# Patient Record
Sex: FEMALE | Race: AMERICAN INDIAN OR ALASKA NATIVE
[De-identification: names, ages, dates, MRNs, and addresses within clinical notes are randomized per-mention and may not be internally consistent; named-entity substitution may affect disease eponyms.]

---

## 2018-05-15 ENCOUNTER — HOSPITAL ENCOUNTER (INPATIENT)
Dept: HOSPITAL 42 - ED | Age: 53
LOS: 6 days | Discharge: HOME | DRG: 175 | End: 2018-05-21
Attending: INTERNAL MEDICINE | Admitting: INTERNAL MEDICINE
Payer: MEDICAID

## 2018-05-15 VITALS — BODY MASS INDEX: 19.1 KG/M2

## 2018-05-15 DIAGNOSIS — D50.9: ICD-10-CM

## 2018-05-15 DIAGNOSIS — J15.9: ICD-10-CM

## 2018-05-15 DIAGNOSIS — B18.1: ICD-10-CM

## 2018-05-15 DIAGNOSIS — N17.9: ICD-10-CM

## 2018-05-15 DIAGNOSIS — I82.220: ICD-10-CM

## 2018-05-15 DIAGNOSIS — J47.9: ICD-10-CM

## 2018-05-15 DIAGNOSIS — R64: ICD-10-CM

## 2018-05-15 DIAGNOSIS — I27.20: ICD-10-CM

## 2018-05-15 DIAGNOSIS — B20: ICD-10-CM

## 2018-05-15 DIAGNOSIS — I26.99: Primary | ICD-10-CM

## 2018-05-15 DIAGNOSIS — B37.0: ICD-10-CM

## 2018-05-15 DIAGNOSIS — J45.909: ICD-10-CM

## 2018-05-15 DIAGNOSIS — I50.9: ICD-10-CM

## 2018-05-15 DIAGNOSIS — N13.30: ICD-10-CM

## 2018-05-15 DIAGNOSIS — Z86.13: ICD-10-CM

## 2018-05-15 LAB
ALBUMIN SERPL-MCNC: 3.1 G/DL (ref 3–4.8)
ALBUMIN/GLOB SERPL: 0.6 {RATIO} (ref 1.1–1.8)
ALT SERPL-CCNC: 29 U/L (ref 7–56)
AMORPH SED URNS QL MICRO: (no result)
APPEARANCE UR: CLEAR
APTT BLD: 26.6 SECONDS (ref 25.1–36.5)
AST SERPL-CCNC: 74 U/L (ref 14–36)
BACTERIA #/AREA URNS HPF: (no result) /[HPF]
BASE EXCESS BLDV CALC-SCNC: 4 MMOL/L (ref 0–2)
BASOPHILS # BLD AUTO: 0.01 K/MM3 (ref 0–2)
BASOPHILS NFR BLD: 0.1 % (ref 0–3)
BILIRUB UR-MCNC: NEGATIVE MG/DL
BNP SERPL-MCNC: 2430 PG/ML (ref 0–450)
BUN SERPL-MCNC: 26 MG/DL (ref 7–21)
CALCIUM SERPL-MCNC: 8.4 MG/DL (ref 8.4–10.5)
COLOR UR: YELLOW
D DIMER PPP FEU-MCNC: 956 NG/ML (ref 0–243)
EOSINOPHIL # BLD: 0 10*3/UL (ref 0–0.7)
EOSINOPHIL NFR BLD: 0 % (ref 1.5–5)
ERYTHROCYTE [DISTWIDTH] IN BLOOD BY AUTOMATED COUNT: 20.2 % (ref 11.5–14.5)
GFR NON-AFRICAN AMERICAN: 43
GLUCOSE UR STRIP-MCNC: NEGATIVE MG/DL
GRANULOCYTES # BLD: 6.43 10*3/UL (ref 1.4–6.5)
GRANULOCYTES NFR BLD: 75.8 % (ref 50–68)
HGB BLD-MCNC: 8 G/DL (ref 12–16)
HGB OTHER MFR BLD ELPH: (no result) /HPF (ref 0–2)
INR PPP: 1.21 (ref 0.93–1.08)
LEUKOCYTE ESTERASE UR-ACNC: (no result) LEU/UL
LYMPHOCYTES # BLD: 1.1 10*3/UL (ref 1.2–3.4)
LYMPHOCYTES NFR BLD AUTO: 13.1 % (ref 22–35)
MCH RBC QN AUTO: 25.8 PG (ref 25–35)
MCHC RBC AUTO-ENTMCNC: 31.4 G/DL (ref 31–37)
MCV RBC AUTO: 82.3 FL (ref 80–105)
MONOCYTES # BLD AUTO: 0.9 10*3/UL (ref 0.1–0.6)
MONOCYTES NFR BLD: 11 % (ref 1–6)
PH BLDV: 7.42 [PH] (ref 7.32–7.43)
PH UR STRIP: 6 [PH] (ref 4.7–8)
PLATELET # BLD: 266 10^3/UL (ref 120–450)
PMV BLD AUTO: 9.6 FL (ref 7–11)
PROT UR STRIP-MCNC: 100 MG/DL
PROTHROMBIN TIME: 13.9 SECONDS (ref 9.4–12.5)
RBC # BLD AUTO: 3.1 10^6/UL (ref 3.5–6.1)
RBC # UR STRIP: (no result) /UL
SP GR UR STRIP: 1.02 (ref 1–1.03)
TROPONIN I SERPL-MCNC: 0.02 NG/ML
URINE FINE GRANULAR CAST: (no result) /HPF (ref 0–2)
URINE HYALINE CAST: (no result) /HPF
UROBILINOGEN UR STRIP-ACNC: 2 E.U./DL
VENOUS BLOOD FIO2: 21 %
VENOUS BLOOD GAS PCO2: 45 (ref 40–60)
VENOUS BLOOD GAS PO2: 32 MM/HG (ref 30–55)
WBC # BLD AUTO: 8.5 10^3/UL (ref 4.5–11)

## 2018-05-16 LAB
% IRON SATURATION: 15 % (ref 20–55)
ALBUMIN SERPL-MCNC: 2.8 G/DL (ref 3–4.8)
ALBUMIN/GLOB SERPL: 0.6 {RATIO} (ref 1.1–1.8)
ALT SERPL-CCNC: 26 U/L (ref 7–56)
ANISOCYTOSIS BLD QL SMEAR: (no result)
AST SERPL-CCNC: 59 U/L (ref 14–36)
BASOPHILS # BLD AUTO: 0.01 K/MM3 (ref 0–2)
BASOPHILS NFR BLD: 0.1 % (ref 0–3)
BUN SERPL-MCNC: 20 MG/DL (ref 7–21)
CALCIUM SERPL-MCNC: 7.7 MG/DL (ref 8.4–10.5)
EOSINOPHIL # BLD: 0 10*3/UL (ref 0–0.7)
EOSINOPHIL NFR BLD: 0 % (ref 1.5–5)
EOSINOPHIL NFR BLD: 1 % (ref 0–3)
ERYTHROCYTE [DISTWIDTH] IN BLOOD BY AUTOMATED COUNT: 20.3 % (ref 11.5–14.5)
FERRITIN SERPL-MCNC: 1570 NG/ML
FOLATE SERPL-MCNC: 9.9 NG/ML
GFR NON-AFRICAN AMERICAN: 43
GRANULOCYTES # BLD: 8.35 10*3/UL (ref 1.4–6.5)
GRANULOCYTES NFR BLD: 80.9 % (ref 50–68)
HBV SURFACE AG SERPL QL CFM: (no result)
HDLC SERPL-MCNC: 12 MG/DL (ref 29–60)
HEPATITIS A IGM: NEGATIVE
HEPATITIS B CORE AB: NEGATIVE
HEPATITIS C ANTIBODY: NEGATIVE
HGB BLD-MCNC: 8 G/DL (ref 12–16)
HYPOCHROMIA BLD QL SMEAR: SLIGHT
IRON SERPL-MCNC: 27 UG/DL (ref 45–180)
LDLC SERPL-MCNC: 80 MG/DL (ref 0–129)
LYMPHOCYTE: 12 % (ref 22–35)
LYMPHOCYTES # BLD: 0.8 10*3/UL (ref 1.2–3.4)
LYMPHOCYTES NFR BLD AUTO: 8 % (ref 22–35)
MCH RBC QN AUTO: 25.6 PG (ref 25–35)
MCHC RBC AUTO-ENTMCNC: 31 G/DL (ref 31–37)
MCV RBC AUTO: 82.4 FL (ref 80–105)
MONOCYTE: 10 % (ref 1–6)
MONOCYTES # BLD AUTO: 1.1 10*3/UL (ref 0.1–0.6)
MONOCYTES NFR BLD: 11 % (ref 1–6)
NEUTROPHILS NFR BLD AUTO: 77 % (ref 50–70)
PLATELET # BLD EST: NORMAL 10*3/UL
PLATELET # BLD: 273 10^3/UL (ref 120–450)
PMV BLD AUTO: 9.7 FL (ref 7–11)
RBC # BLD AUTO: 3.13 10^6/UL (ref 3.5–6.1)
TARGETS BLD QL SMEAR: SLIGHT
TIBC SERPL-MCNC: 179 UG/DL (ref 265–497)
VIT B12 SERPL-MCNC: 907 PG/ML (ref 239–931)
WBC # BLD AUTO: 10.3 10^3/UL (ref 4.5–11)

## 2018-05-16 RX ADMIN — HEPARIN SODIUM PRN MLS/HR: 10000 INJECTION, SOLUTION INTRAVENOUS at 01:23

## 2018-05-16 RX ADMIN — CEFEPIME SCH MLS/HR: 1 INJECTION, SOLUTION INTRAVENOUS at 17:47

## 2018-05-16 RX ADMIN — PANTOPRAZOLE SODIUM SCH MG: 40 TABLET, DELAYED RELEASE ORAL at 10:02

## 2018-05-16 RX ADMIN — CEFEPIME SCH MLS/HR: 1 INJECTION, SOLUTION INTRAVENOUS at 10:07

## 2018-05-16 RX ADMIN — SULFAMETHOXAZOLE AND TRIMETHOPRIM SCH TAB: 400; 80 TABLET ORAL at 10:02

## 2018-05-16 RX ADMIN — VANCOMYCIN HYDROCHLORIDE SCH MLS/HR: 1 INJECTION, POWDER, LYOPHILIZED, FOR SOLUTION INTRAVENOUS at 12:26

## 2018-05-16 NOTE — CON
DATE:  05/16/2018



CARDIOLOGY CONSULTATION



HISTORY:  The patient is a 52-year-old woman, who presents to the emergency

room with a history of asthma, HIV in the past as well as malaria.  She

complains of shortness of breath.



She was recently traveled from [facility] with an upper respiratory

infection.



She denies previous cardiac history.  No angina and no chest pain noted.



History and review of systems are essentially unavailable.



PHYSICAL EXAMINATION:

VITAL SIGNS:  Blood pressure is 114/73, heart rates in the 90s.

NECK:  Negative JVD.

LUNGS:  Without rales.

HEART:  Reveals S1, S2.

EXTREMITIES:  Without edema.



EKG shows normal sinus rhythm with frequent PVCs and T-wave inversions in

V1 and V2.



LABORATORY DATA:  Troponins are negative.  Hemoglobin is 8.  BUN and

creatinine are 20 and 1.3.  V/Q scan shows an intermediate probability for

pulmonary embolism.  There is a moderate-size perfusion defect in the left

upper lobe.



Chest x-ray is pending.



IMPRESSION:

1.  Intermediate probability for pulmonary embolism.

2.  History of malaria.

3.  History of human immunodeficiency virus.

4.  Marked anemia.



PLAN:  Given these findings, the patient will need to review the chest

x-ray.  If there is a matching defect, a CT scan of the chest would be

appropriate.  An echocardiogram has been ordered to evaluate her LV

function.





__________________________________________

Ru Wylie MD



DD:  05/16/2018 8:15:49

DT:  05/16/2018 8:18:47

Job # [id number]

## 2018-05-16 NOTE — CARD
--------------- APPROVED REPORT --------------





EKG Measurement

Heart [id number]

IA [id number]

[id number]

[id number]

[id number]



<Conclusion>

Sinus tachycardia with frequent premature ventricular complexes

T wave abnormality, consider anterior ischemia

Abnormal ECG

## 2018-05-16 NOTE — CON
DATE:  2018



LOCATION:  The patient is seen in the room 264, bed 2.



CHIEF COMPLAINT  Shortness of breath times several days.



HISTORY OF PRESENT ILLNESS:  This is a 52-year-old female from [place] from

[facility].  She was recently there, returned from [facility].  She has had

diagnosis of malaria and was treated 3 weeks ago.  Also, she has had

positive HIV, which was diagnosed in , was on HIV medication, she does

not remember the name of the medication, she stopped it in 2017.  She has

not been on the HIV medication since and she states that day she does not

know her T-cells or viral load and she denies any headaches; minimal cough.

There is shortness of breath, low-grade fevers.  No abdominal pain,

diarrhea or constipation or bright red blood per rectum.



PAST MEDICAL HISTORY:  Significant for positive HIV, asthma and recent

diagnosis of malaria.



PAST SURGICAL HISTORY:  Significant for .



ALLERGIES:  THE PATIENT IS ALLERGIC TO PIPERACILLIN AND TAZOBACTAM, SHE

DOES NOT BELIEVE IT IS TYPE 1.



MEDICATIONS AT HOME:  Noted and reviewed.  She states she has not been

taking any medications at all, she stopped her HIV medications in 2017.



PHYSICAL EXAMINATION:

VITAL SIGNS:  Temperature is 100.4, blood pressure is 100/50, respiratory

rate of 21, heart rate of 103, saturation is 100% on room air.

HEENT:  Examination is unremarkable.

NECK:  Supple.

LUNGS:  Have decreased breath sounds.

HEART:  Normal S1, S2.

ABDOMEN:  Soft, nontender.



DATA:  Laboratory examination reveals the patient's white count is 8.5,

hemoglobin of 8, platelets of 266, 75% granulocytosis.  There is 10%

monocytosis.  Coagulation is noted and D-dimer is elevated.  Chemistries

reveal the creatinine is 1.3, which has come down to normal, today is 0.9..

Urinalysis is noted, many bacteria, 10-15 WBCs.  Review of orders reveals

blood cultures have been ordered and received chest x-ray reading is not

available.  TB workup has been ordered.  HIV PCR and CD-4 count has been

ordered and hepatitis profile.  The patient is empirically started on

cefepime, was given a dose of vancomycin.



ASSESSMENT AND PLAN:  A 52-year-old female with positive HIV, history of

recent diagnosis of malaria and history of asthma, now returns with

pulmonary emboli with SIRS, systemic inflammatory response syndrome, acute

kidney injury, currently on cefepime.  We will check on the pan cultures,

HIV status and chest x-ray results and we will make further recommendations

upon availability of initial results.  We will follow closely with you.  We

will also order a procalcitonin.







__________________________________________

Ricky Naik MD



DD:  2018 7:28:10

DT:  2018 7:31:26

Job # [id number]

## 2018-05-16 NOTE — CARD
--------------- APPROVED REPORT --------------





EKG Measurement

Heart [id number]

NE [id number]

[id number]

[id number]

[id number]



<Conclusion>

Normal sinus rhythm

T wave abnormality, consider anterior ischemia

Prolonged QT

Abnormal ECG

## 2018-05-16 NOTE — NM
EXAM:

  NM Lung Perfusion and Ventilation Scan



EXAM DATE/TIME:

  5/15/2018 8:47 PM



CLINICAL HISTORY:

  The patient age is 52 years old and is female; Signs and symptoms; Cough and 

shortness of breath; Symptoms not specified; Additional info: 52yof with SOB 

Facility exam id and description: Nm lungpv lung perf vent scan



TECHNIQUE:

  Nuclear Medicine ventilation and perfusion images of the lungs were obtained 

in multiple projections following inhalation of 33.0 mCi Tc99m-DTPA and 

injection of 3.5 mCi Tc99m MAA.



COMPARISON:

  No relevant prior studies available.



FINDINGS:

  Ventilation:  The ventilation is inhomogeneous. There is central airway 

deposition, consistent with COPD.

  Perfusion:  There is a moderate-sized perfusion defect involving the left 

upper lobe. No definitive perfusion defects are identified within the right 

lung.



IMPRESSION:     

1.  There is a moderate-sized perfusion defect involving the left upper lobe.

2.  Intermediate probability study.

3.  COPD.

## 2018-05-16 NOTE — US
HISTORY:

Leg pain and swelling. Evaluate for DVT



PHYSICIAN(S):  Ru Cotton MD.



TECHNIQUE:

Duplex sonography and color-flow Doppler with graded compression were 

used to evaluate the deep venous systems of both lower extremities. 

The exam is limited by edema.



FINDINGS:

The visualized deep venous systems of both lower extremities are 

sonographically normal and compressible. Normal wave forms and 

augmentation are seen. There is no sonographic evidence for deep 

venous thrombosis in the visualized segments of both lower 

extremities.



IMPRESSION:

No sonographic evidence for deep venous thrombosis in the visualized 

segments of both lower extremities.

## 2018-05-16 NOTE — CT
PROCEDURE:  CT Chest without contrast



HISTORY:

atypical infection in immunocompromised patient



COMPARISON:

None.



TECHNIQUE:

Contiguous axial images were obtained through the chest without 

intravenous contrast enhancement. Sagittal and coronal 

reconstructions were performed.







Radiation dose (DLP): 141 mGy-cm. 



This CT exam was performed using one or more of the following dose 

reduction techniques: Automated exposure control, adjustment of the 

mA and/or kV according to patient size, and/or use of iterative 

reconstruction technique.



FINDINGS:



LUNGS:

Multi focal small areas of consolidation are seen in both lungs.  

Findings are consistent with pneumonia 



MEDIASTINUM:

Unremarkable thoracic aorta. No aneurysm. Normal sized heart. Main 

pulmonary artery unremarkable. No vascular congestion. No 

lymphadenopathy.



PLEURA:

No pleural fluid. No pneumothorax.



BONES:

No fracture. No destructive lesion. 



UPPER ABDOMEN:

There is right-sided hydronephrosis and perinephric stranding.



OTHER FINDINGS:

None.



IMPRESSION:

Multi focal pneumonia



Right-sided hydronephrosis and perinephric stranding.  Possible 

pyelonephritis

## 2018-05-16 NOTE — RAD
HISTORY:

52yoF with sob  



COMPARISON:

No prior. 



FINDINGS:



LUNGS:

.Mild diffuse bilateral infiltrates. Rule out pulmonary edema/CHF 

versus pneumonia 



PLEURA:

No significant pleural effusion identified, no pneumothorax apparent.



CARDIOVASCULAR:

Normal.



OSSEOUS STRUCTURES:

No significant abnormalities.



VISUALIZED UPPER ABDOMEN:

Normal.



OTHER FINDINGS:

None.



IMPRESSION:

Diffuse bilateral infiltrates.  Rule out pulmonary edema/ CHF or 

pneumonia.

## 2018-05-16 NOTE — CON
DATE:  2018



HISTORY OF PRESENT ILLNESS:  This is a 52-year-old lady with history of HIV

and noncompliance with HAART medication, who presented with increased

shortness of breath and minimal cough.  Those symptoms were getting worse

over the course of 3-4 days.  In terms of other chronic associated

findings, the patient reports some weight loss over the last few months. 

She denies fever, chills, sweats or nausea, vomiting, diarrhea,

constipation.  The patient stopped taking her HAART medication for over a

year citing some family issues.  Over the course of hospitalization, the

patient was found to have VTE/PE and was started on therapeutic

anticoagulation.  Preliminary review of echocardiogram revealed right

ventricular dilatation and severe pulmonary hypertension with questionable

thrombus in the IVC.  Presently, the patient is on cefepime and vancomycin

and ID Service is following her as well.



PAST MEDICAL HISTORY:  HIV, questionable asthma, history of treated

malaria.



PAST SURGICAL HISTORY:   x3.



SOCIAL HISTORY:  The patient denies alcohol or illicit drug abuse.  Denies

tobacco smoking.



ALLERGIES:  ZOSYN.



MEDICATIONS:  At home, none.



REVIEW OF SYSTEMS:  Review of 12-organ systems other than mentioned in the

history of present illness is negative.



FAMILY HISTORY:  Noncontributory.



PHYSICAL EXAMINATION:

VITAL SIGNS:  Temperature 100.4, heart rate 103, blood pressure 114/73,

respiratory rate 18, oxygen saturation 98% on room air.

ENT:  Head and neck atraumatic.

LUNGS:  Clear to auscultation bilaterally.

HEART:  Regular rate and rhythm.  S1 and S2 normal.

ABDOMEN:  Soft, nontender and nondistended.

MUSCULOSKELETAL:  No C/C/E.

NEURO:  The patient moves all extremities spontaneously.

SKIN:  Moist.

PSYCH:  The patient is alert and oriented x3.



LABORATORY DATA:  Sodium 139, potassium 3.6, chloride 107, carbon dioxide

23, BUN 20, creatinine 1.3 (stable), glucose 101, bilirubin 1.4, AST 59,

ALT 26 (trended down), alkaline phosphatase 124.



Lactic acid as of yesterday 1.4, pH 7.42, INR 1.21 and PTT 58.3 (today).



V/Q scan of the lungs showed moderate-size perfusion defect involving the

left upper lobe, intermediate probability study.  Chest x-ray showed

questionable bilateral infiltrates.



ASSESSMENT AND PLAN:  This is a 52-year-old lady with human

immunodeficiency virus, severe pulmonary hypertension, who presented with

what appears to be acute pulmonary embolism/venous thromboembolism.  At

present time, the patient's symptoms substantially improved.  She is on

therapeutic anticoagulation and her PTT is within therapeutic range at

present time.  Infectious Disease Service is following her for human

immunodeficiency virus and potential community-acquired pneumonia.  CAT

scan of the chest will be done to evaluate for lymphadenopathy (whether she has 
one or not) and obtain

more details on bilateral infiltrate.  At present time, the patient is on

airborne isolation and three AFBs are pending.  QuantiFERON test is pending

as well.  Infectious workup is in progress.  I will avoid IV contrast due

to presence of acute kidney injury.  CAT scan will also allow better

assessment whether or not emphysema is present.  At present time, I would

continue target euvolemia, euglycemia, normothermia and oxygen saturation

more than 90%.  The patient is not hypoxemic and reports improved symptoms.

I have low suspicion for Pneumocystis carinii pneumonia.  We will continue

with gastrointestinal prophylaxis.



Addendum: CT chest showed some bronchiectases, most prominantly in left 
lingular segment, some associated with consolidative processes, multifocal 
small infiltrates, some nodular/mass-like and pleural based (RLL). No 
lymphadenopathy, which makes Mtb less likely, but in HIV setting Mtb may have 
atypical presentation. AFB x 3 ordered--if having hard time to produce any 
sputum will try hypertonic saline inhalation for sputum induction. Quantiferron 
test is pending. PPD placement under discretion of ID service.



ccm time 40 min





__________________________________________

Medardo Mckinley MD





DD:  2018 12:29:51

DT:  2018 12:34:03

Job # [id number]



BING

## 2018-05-16 NOTE — CP.PCM.HP
<Byron Tillman - Last Filed: 18 05:23>





History of Present Illness





- History of Present Illness


History of Present Illness: 





CC: Shortness of breath





HPI: 52 year old  female with past medical history that includes Asthma, 

HIV with unknown viral load or CD4 count, and recent diagnosis and treatment 

for malaria who presents to [facility] ED complaining of shortness of breath. Patient 

reports a 3 plus pillow orthopnea. Denies cough or swelling of lower 

extremities.  Patient reports she recently traveled from [facility] by airplane 3 

weeks prior to presentation. Patient was previously seen in [facility] and 

diagnosed with Malaria and treated inpatient for 2 weeks. Patient reports her 

Malaria was diagnosed via blood work. Of note patient has not been on any HAART 

therapy for HIV in over a year. Patient does not follow a regular doctor to 

manage her HIV. Patient reports a 3 kg weight loss secondary to lack of 

appetite over the past month. Other than shortness of breath, patient denies 

fever, chills, body aches, night sweats, headache, changes in vision, numbness, 

dizziness, hearing loss, acute memory loss, trouble swallowing, oral ulcers, 

chest pain, abdominal pain, diarrhea, constipation, new skin rashes or lesions. 

12 point ROS otherwise mentioned in HPI is benign.





PMH: HIV unknown viral load or CD4 count 


PSH:  x3


SOCHx: Denies Tobacco, ETOH, ID, patient lives with family and children, retired

, previously was a 


ALL: Piperacillin, tazobactam


Meds: MAR reviewed











Present on Admission





- Present on Admission


Any Indicators Present on Admission: No





Review of Systems





- Review of Systems


All systems: reviewed and no additional remarkable complaints except (as 

mentioned in HPI)





Past Patient History





- Past Social History


Smoking Status: Never Smoked


Alcohol: None


Drugs: Denies


Home Situation {Lives}: With Family





- CARDIAC


Hx Cardiac Disorders: No





- PULMONARY


Hx Respiratory Disorders: Yes


Hx Asthma: Yes





- NEUROLOGICAL


Hx Neurological Disorder: No





- HEENT


Hx HEENT Problems: No





- RENAL


Hx Chronic Kidney Disease: No





- ENDOCRINE/METABOLIC


Hx Endocrine Disorders: No





- HEMATOLOGICAL/ONCOLOGICAL


Hx Blood Disorders: Yes


Other/Comment: MALARIA





- INTEGUMENTARY


Hx Dermatological Problems: No





- MUSCULOSKELETAL/RHEUMATOLOGICAL


Hx Falls: No





- GASTROINTESTINAL


Hx Gastrointestinal Disorders: No





- GENITOURINARY/GYNECOLOGICAL


Hx Genitourinary Disorders: No





- PSYCHIATRIC


Hx Psychophysiologic Disorder: No


Hx Substance Use: No





- SURGICAL HISTORY


Hx  Section: Yes





Meds


Allergies/Adverse Reactions: 


 Allergies











Allergy/AdvReac Type Severity Reaction Status Date / Time


 


piperacillin [From Zosyn] AdvReac  ITCHING Verified 18 01:28


 


tazobactam [From Zosyn] AdvReac  ITCHING Verified 18 01:28














Physical Exam





- Constitutional


Appears: Non-toxic, No Acute Distress





- Head Exam


Head Exam: ATRAUMATIC, NORMAL INSPECTION, NORMOCEPHALIC





- Eye Exam


Eye Exam: EOMI, PERRL





- ENT Exam


ENT Exam: Mucous Membranes Dry





- Respiratory Exam


Respiratory Exam: Clear to Auscultation Bilateral, NORMAL BREATHING PATTERN.  

absent: Rales, Rhonchi, Wheezes





- Cardiovascular Exam


Cardiovascular Exam: REGULAR RHYTHM, +S1, +S2





- GI/Abdominal Exam


GI & Abdominal Exam: Normal Bowel Sounds, Soft.  absent: Tenderness





- Extremities Exam


Extremities exam: Positive for: pedal edema (1+ edemea b/l), pedal pulses 

present.  Negative for: calf tenderness, tenderness





- Neurological Exam


Neurological exam: Alert, Oriented x3


Additional comments: 





motor and sensory grossly intact, able to move all four extremities past 

midline 





- Psychiatric Exam


Psychiatric exam: Normal Affect, Normal Mood





- Skin


Skin Exam: Dry, Normal Color





Results





- Vital Signs


Recent Vital Signs: 





 Last Vital Signs











Temp  98.9 F   18 02:15


 


Pulse  92 H  18 02:15


 


Resp  21   18 02:29


 


BP  101/62   18 02:15


 


Pulse Ox  100   18 02:15














- Labs


Result Diagrams: 


 05/15/18 18:55





 05/15/18 18:55


Labs: 





 Laboratory Results - last 24 hr











  18





  00:27 00:27


 


Iron   27 L


 


TIBC   179 L


 


% Saturation   15 L


 


Triglycerides  144 


 


Cholesterol  114 L 


 


LDL Cholesterol Direct  80 


 


HDL Cholesterol  12 L 














Assessment & Plan





- Assessment and Plan (Free Text)


Assessment: 





52 year old  female with past medical history that includes Asthma, HIV 

with unknown viral load or CD4 count, and recent diagnosis and treatment for 

malaria who presents to [facility] ED complaining of shortness of breath. Patient with 

elevated D-Dimer, recent long flight travel, and V/Q scan showing moderate 

sized perfusion defect involving the left upper lobe. Patient given heparin 

bolus and placed on heparin gtt. 


Plan: 





Suspected Pulmonary Embolism


- Hx of recent long airflight, HIV, 


- D-Dimer 900+, V/Q scan showing moderate sized perfusion defect involving the 

left upper lobe, intermediate probability study


- CXR: no acute disease


- Echocardiogram


- PT/INR


- Heparin bolus, Heparin gtt





HIV


- Viral load, CD4/CD8 count


- Not currently on HAART therapy


- Bactrim for PCP ppx 


- ID consult





TIMO


- Cr 1.3/BUN 26


- Likely volume depleted 2/2 patient poor appetitie


- Holding IVF hydration at this time 2/2 sxs of CHF


- Monitor in AM





Hx of Malaria


- Recent inpatient treatment for 2 weeks in [facility] for Malaria, afebrile


- Patient unable to recall medication regiment


- Peripheral smear


- Haptoglobin, Reticulocyte count


- Malaria specific studies 





Anemia - Normocytic


- Hgb 8/Hct 25.5, MCV 82.3


- Patient borderline hypotensive, with some tachycardia


- CBC in AM


- Consider transfusion in light of potential cardiac disease 


- Iron, TIBC, Ferritin, Percent sat, Folate, VB12





Elevated BNP


- 2430 pro BNP


- Cardiology consult


- Echocardiogram





DVT ppx: Heparin gtt


GI ppx: Protonix





Case and plan discussed with attending 








- Date & Time


Date: 18


Time: 04:38





<Cami SNOW,Zachary - Last Filed: 18 05:55>





Results





- Vital Signs


Recent Vital Signs: 





 Last Vital Signs











Temp  100.4 F H  18 02:30


 


Pulse  103 H  18 02:30


 


Resp  18   18 02:30


 


BP  114/73   18 02:30


 


Pulse Ox  98   18 02:30














- Labs


Result Diagrams: 


 05/15/18 18:55





 05/15/18 18:55


Labs: 





 Laboratory Results - last 24 hr











  18





  00:27 00:27


 


Iron   27 L


 


TIBC   179 L


 


% Saturation   15 L


 


Triglycerides  144 


 


Cholesterol  114 L 


 


LDL Cholesterol Direct  80 


 


HDL Cholesterol  12 L 














Attending/Attestation





- Attestation


I have personally seen and examined this patient.: Yes


I have fully participated in the care of the patient.: Yes


I have reviewed all pertinent clinical information: Yes


Notes (Text): 











-I agree with the above H&P completed by the resident physician with the 

following additions and/or changes:





-The patient is a 52 year old  woman with a history of asthma and HIV (

last CD4 count unknown), who developed sudden SOB and dry cough after traveling 

on a flight from [facility] to [place] 3 days ago. She also reports having 

been diagnosed and treated for malaria 3 weeks ago while in [facility]. Of note, 

she hasnt taken any HIV meds for the past 1 year. She also reports two weeks 

of 3-pillow orthopnea as well as a 3kg unintentional weight loss. She denies 

any chest pain, PND, wheezing, dysuria, abdominal pain, OCP use, history of 

blood clots, fevers, chills, sweats, hemoptysis, hematuria or bloody stool. In 

the ED, her respiratory rate and O2 sat (on room air) were normal and her CXR 

showed increased vascular markings bilaterally. Given her numerous risk factors 

for TB (alongside the SOB and cough), shell be ruled out for pulmonary TB with 

sputum AFBs. Also, Bactrim will be started for PJP prophylaxes (since CD4 

unknown). Blood tests for hepatitis, CD4 count, HIV viral load and malaria have 

all been ordered. She will also be empirically treated for PNA. Cardiology and 

ID consults have been placed. Her symptoms are probably due to undiagnosed CHF 

and acute PE, as noted on V/Q scan. Therapeutic Heparin drip will be started 

and a Doppler BLE U/S and 2D-echo have both been ordered. Of note, Lasix not 

given due to the patients low-normal SBPs in the ED.

## 2018-05-17 LAB
ALBUMIN SERPL-MCNC: 2.5 G/DL (ref 3–4.8)
ALBUMIN/GLOB SERPL: 0.5 {RATIO} (ref 1.1–1.8)
ALT SERPL-CCNC: 21 U/L (ref 7–56)
AST SERPL-CCNC: 53 U/L (ref 14–36)
BASOPHILS # BLD AUTO: 0 K/MM3 (ref 0–2)
BASOPHILS NFR BLD: 0 % (ref 0–3)
BUN SERPL-MCNC: 18 MG/DL (ref 7–21)
CALCIUM SERPL-MCNC: 7.7 MG/DL (ref 8.4–10.5)
EOSINOPHIL # BLD: 0 10*3/UL (ref 0–0.7)
EOSINOPHIL NFR BLD: 0.1 % (ref 1.5–5)
ERYTHROCYTE [DISTWIDTH] IN BLOOD BY AUTOMATED COUNT: 20.8 % (ref 11.5–14.5)
GFR NON-AFRICAN AMERICAN: 52
GRANULOCYTES # BLD: 5.53 10*3/UL (ref 1.4–6.5)
GRANULOCYTES NFR BLD: 75.4 % (ref 50–68)
HGB BLD-MCNC: 7.3 G/DL (ref 12–16)
LYMPHOCYTES # BLD: 1 10*3/UL (ref 1.2–3.4)
LYMPHOCYTES NFR BLD AUTO: 13.1 % (ref 22–35)
MCH RBC QN AUTO: 25.3 PG (ref 25–35)
MCHC RBC AUTO-ENTMCNC: 30.4 G/DL (ref 31–37)
MCV RBC AUTO: 83.3 FL (ref 80–105)
MONOCYTES # BLD AUTO: 0.8 10*3/UL (ref 0.1–0.6)
MONOCYTES NFR BLD: 11.4 % (ref 1–6)
PLATELET # BLD: 281 10^3/UL (ref 120–450)
PMV BLD AUTO: 9.6 FL (ref 7–11)
RBC # BLD AUTO: 2.88 10^6/UL (ref 3.5–6.1)
T3 SERPL-MCNC: 0.59 NG/ML (ref 0.97–1.69)
T4 SERPL-MCNC: 5.6 UG/DL (ref 5.5–11)
WBC # BLD AUTO: 7.3 10^3/UL (ref 4.5–11)

## 2018-05-17 RX ADMIN — PANTOPRAZOLE SODIUM SCH MG: 40 TABLET, DELAYED RELEASE ORAL at 05:04

## 2018-05-17 RX ADMIN — SULFAMETHOXAZOLE AND TRIMETHOPRIM SCH TAB: 400; 80 TABLET ORAL at 09:39

## 2018-05-17 RX ADMIN — VANCOMYCIN HYDROCHLORIDE SCH MLS/HR: 1 INJECTION, POWDER, LYOPHILIZED, FOR SOLUTION INTRAVENOUS at 09:44

## 2018-05-17 RX ADMIN — CEFEPIME SCH MLS/HR: 1 INJECTION, SOLUTION INTRAVENOUS at 21:56

## 2018-05-17 RX ADMIN — HEPARIN SODIUM PRN MLS/HR: 10000 INJECTION, SOLUTION INTRAVENOUS at 05:03

## 2018-05-17 RX ADMIN — CEFEPIME SCH MLS/HR: 1 INJECTION, SOLUTION INTRAVENOUS at 02:30

## 2018-05-17 RX ADMIN — CEFEPIME SCH MLS/HR: 1 INJECTION, SOLUTION INTRAVENOUS at 09:39

## 2018-05-17 NOTE — PN
DATE:  05/17/2018



SUBJECTIVE:  The patient is seen and examined at bedside.  She is

comfortable.  She is comfortably sleeping in the bed, but easily arousable

and awaken.  She is alert, awake, oriented x3.  She reports that her

shortness of breath is gone.  She was able to produce first sputum

yesterday for AFB assessment (with help of inhaled hypertonic saline).  Her

next AFB sputum is due today at 2:00 p.m.



PHYSICAL EXAMINATION:

VITAL SIGNS:  Blood pressure 98/62, respiratory rate 18, oxygen saturation

98% on room air, temperature 99.2, heart rate 100.

ENT:  Head and neck atraumatic.

LUNGS:  Decreased breath sounds in the right base.

HEART:  Regular rate and rhythm.  S1, S2 normal.

ABDOMEN:  Soft, nontender and nondistended.

MUSCULOSKELETAL:  Some muscle wasting, but no C/C/E.

NEUROLOGIC:  The patient moves all extremities spontaneously.

SKIN:  Moist.

PSYCHOLOGIC:  The patient is alert and oriented x3.



LABORATORY DATA  WBC is 7.3, hemoglobin 7.3 (haptoglobin elevated, LDH

normal), platelet count 281.  Sodium 136, potassium 3.6, chloride 106,

carbon dioxide 23, BUN 18, creatinine 1.1 down from 1.3, glucose 80, AST

53, ALT 21, total bilirubin 1.2.  Procalcitonin 1.38.  The patient has

hepatitis B antigen positive.  PTT is 41.



MEDICATIONS  Heparin drip, cefepime, Protonix, inhaled hypertonic saline,

Bactrim, vancomycin.



ASSESSMENT AND PLAN:  This 52-year-old lady who presented with acute

pulmonary embolism with intermediate risk for short-term mortality, who was

started on therapeutic anticoagulation with heparin.  The patient's

symptoms substantially improved.  Her heparin dose will be adjusted

according to protocol.  Hematology consult was requested to figure out what

would be the best oral anticoagulant to switch to, provided combination of

the patient,'s comorbidity and social factors.  The patient had low-grade

fever 2 days ago which most likely can be attributed to VTE, however combination

of low grade fever and chest radiographic images did not allow to rule out

community-acquired pneumonia with certainty and the patient was started on

antibiotics while Infectious Disease service was following her.  The

patient was also put on isolation and first set of AFB sputum was sent for

analysis and received.  Whether or not to start atypical coverage including

azithromycin will be deferred to Infectious Disease service.  At present

time, the patient is on cefepime and vancomycin.  Her acute kidney injury

is substantially improved and creatinine now is 1.1 which is trending down.

It is reasonable to trend procalcitonin.  The patient definitely would need

to be followed as an outpatient at human immunodeficiency virus clinic. 

The patient's bronchiectasis most likely relates to human immunodeficiency

virus infection.  The patient does not produce copious amount of sputum. 

In fact,  she denies any coughing spells and required hypertonic saline for

sputum production.  We will continue to target euvolemia, euglycemia,

normothermia and saturation more than 90%.  Gastrointestinal prophylaxis.



ccm time 40 min









__________________________________________

Medardo Mckinley MD







DD:  05/17/2018 11:13:46

DT:  05/17/2018 11:18:37

Job # [id number]



BING

## 2018-05-17 NOTE — CP.PCM.PN
Addendum entered and electronically signed by Jeet Terrazas DO  05/17/18 15:42

: 





On physical exam





Patient has CVA tenderness on Right side only. 





Original Note:








<Jeet Terrazas - Last Filed: 05/17/18 14:27>





Subjective





- Date & Time of Evaluation


Date of Evaluation: 05/17/18


Time of Evaluation: 06:45





- Subjective


Subjective: 





PGY1 Medicine Note for Dr. Negrete





Patient seen and examined at bedside. No acute events overnight. Patient is 

resting comfortably in her bed stating that she feels much better. Overnight 

she experienced chils Her breathing is much improved. Her cough has improved 

and she is now having difficulty producing sputum for cultures. She is 

tolerating her diet, having normal bowel movements without any nausea or 

vomiting. She has no complaints at this time. Denies nausea, vomiting, diarrhea

, constipation, chest pain, shortness of breath, palpitations, abdominal pain, 

headaches, vision changes, numbness or tingling. 





Objective





- Vital Signs/Intake and Output


Vital Signs (last 24 hours): 


 











Temp Pulse Resp BP Pulse Ox


 


 97.1 F L  89   19   96/50 L  98 


 


 05/17/18 12:00  05/17/18 12:00  05/17/18 12:00  05/17/18 12:00  05/17/18 06:00








Intake and Output: 


 











 05/17/18 05/17/18





 06:59 18:59


 


Intake Total 1150 70


 


Balance 1150 70














- Medications


Medications: 


 Current Medications





Heparin Sodium/Sodium Chloride (Heparin 15225 Units/250ml 1/2 Normal Saline)  25

,000 units in 250 mls @ 9.226 mls/hr IV .Q24H PRN; Protocol; 18 UNITS/KG/HR


   PRN Reason: ADJUST RATE PER PROTOCOL


   Last Titration: 05/17/18 08:07 Dose:  20.29 units/kg/hr, 10.4 mls/hr


Vancomycin HCl (Vancomycin 1gm)  1 gm in 250 mls @ 167 mls/hr IVPB DAILY TD


   PRN Reason: Protocol


   Last Admin: 05/17/18 09:44 Dose:  167 mls/hr


Cefepime HCl (Maxipime 2gm)  2 gm in 100 mls @ 100 mls/hr IVPB Q12 TD


   PRN Reason: Protocol


   Stop: 05/21/18 01:46


Pantoprazole Sodium (Protonix Ec Tab)  40 mg PO 0600 [facility]


   Last Admin: 05/17/18 05:04 Dose:  40 mg


Sodium Chloride (Sodium Chloride 7% Inhalation)  4 ml IH QIDRESP [facility]


Trimethoprim/Sulfamethoxazole (Bactrim Ss Tab)  1 tab PO DAILY [facility]


   PRN Reason: Protocol


   Last Admin: 05/17/18 09:39 Dose:  1 tab











- Labs


Labs: 


 





 05/17/18 05:30 





 05/17/18 05:30 





 











PT  13.9 SECONDS (9.4-12.5)  H  05/15/18  18:55    


 


INR  1.21  (0.93-1.08)  H  05/15/18  18:55    


 


APTT  41.0 Seconds (25.1-36.5)  H  05/17/18  05:30    














- Constitutional


Appears: Non-toxic, No Acute Distress





- Head Exam


Head Exam: ATRAUMATIC, NORMOCEPHALIC





- Eye Exam


Eye Exam: EOMI, Normal appearance





- ENT Exam


ENT Exam: Mucous Membranes Moist





- Respiratory Exam


Respiratory Exam: Decreased Breath Sounds (right base), Clear to Ausculation 

Bilateral, NORMAL BREATHING PATTERN.  absent: Accessory Muscle Use, Rales, 

Rhonchi, Wheezes, Respiratory Distress





- Cardiovascular Exam


Cardiovascular Exam: REGULAR RHYTHM, +S1, +S2





- GI/Abdominal Exam


GI & Abdominal Exam: Soft, Normal Bowel Sounds.  absent: Distended, Firm, 

Guarding, Rigid, Tenderness





- Extremities Exam


Extremities Exam: absent: Calf Tenderness, Pedal Edema





- Neurological Exam


Neurological Exam: Alert, Awake, Oriented x3





- Psychiatric Exam


Psychiatric exam: Normal Affect, Normal Mood





- Skin


Skin Exam: Dry, Warm





Assessment and Plan





- Assessment and Plan (Free Text)


Assessment: 





52 year old  female with past medical history that includes Asthma, HIV 

with unknown viral load or CD4 count, and recent diagnosis and treatment for 

malaria who [place] [facility] [place] complaining of shortness of breath with left 

sided pulmonary embolism, HIV, multifocal pneumonia, hydronephrosis and 

pyelonephrosis.


Plan: 





Pulmonary Embolism


- Respiratory Consulted, Dr. Mckinley - help appreciated


- Hx of recent long airflight, HIV


- D-Dimer 900+, V/Q scan showing moderate sized perfusion defect involving the 

left upper lobe, intermediate probability study


   - No CTA due to patient's estimated GFR of 52 and TIMO.


- CXR: no acute disease


- Echocardiogram - awaiting official report


- PT/INR


- Heparin gtt





Lung Infiltrates, probable multi-focal pneumonia r/o TB


- Chest CT w/o - Multi focal pneumonia. Right-sided hydronephrosis and 

perinephric stranding.  Possible pyelonephritis


- CXR: no acute disease


- Blood Culture - negative at 24 hours x 2


- Urine Culture - negative


- Procalcitonin 1.38


- Legionella - negative


- Strep pneuo - f/u


- Alpha-1 antitrypsin - f/u


- Vanco 1gm IVPB daily (started 5/16)


- Cefepime 2gm IVPB q12h (started 5/16, last dose 5/21)


- Bactrim SS 1 tab PO daily (started 5/16)





- Patient currently on droplet precautions, can d/c after 1 AFB as TB is not 

likely. Patient is not coughing up any blood and there is no radiographic 

evidence in correlation with TB. 


   - AFB - f/u


   - Quat Gold - f/u


   - PPD placed





HIV


- f/u Viral load, CD4/CD8 count


- Not currently on HAART therapy


- Bactrim for PCP ppx 


- ID consult





TIMO 


- Cr 1.3 upon admission --> improving to Cr 1.1 today


- Likely volume depleted 2/2 patient poor appetitie


- Holding IVF hydration at this time 2/2 sxs of CHF


- Monitor in AM





Hx of Malaria


- Recent inpatient treatment for 2 weeks in [facility] for Malaria, afebrile


- Patient unable to recall medication regiment


- Peripheral smear


- Haptoglobin, Reticulocyte count


- Malaria specific studies 





Anemia - Normocytic


- Hgb 7.3/Hct 24.0, MCV 75.4


- Patient borderline hypotensive, with some tachycardia


- CBC in AM


- Consider transfusion in light of potential cardiac disease 


- Iron, TIBC, Ferritin, Percent sat, Folate, VB12





Elevated BNP


- 2430 pro BNP


- Cardiology consult


- Echocardiogram - EF ~50%, Moderate MR, moderate TR, severe pulmonary HTN. 

Right ventricle moderately dilated, right atrium severely dilated. 





Hx of Hep B


- Hep Bs ag positive


- Hep B core IgM Ab negative





DVT ppx: Heparin gtt


GI ppx: Protonix





Case discussed with Dr. Penelope Terrazas PGY1





<Colt Negrete - Last Filed: 05/17/18 16:33>





Objective





- Vital Signs/Intake and Output


Vital Signs (last 24 hours): 


 











Temp Pulse Resp BP Pulse Ox


 


 97.1 F L  89   19   96/50 L  98 


 


 05/17/18 12:00  05/17/18 12:00  05/17/18 12:00  05/17/18 12:00  05/17/18 06:00








Intake and Output: 


 











 05/17/18 05/17/18





 06:59 18:59


 


Intake Total 1150 70


 


Balance 1150 70














- Medications


Medications: 


 Current Medications





Heparin Sodium/Sodium Chloride (Heparin 53531 Units/250ml 1/2 Normal Saline)  25

,000 units in 250 mls @ 9.226 mls/hr IV .Q24H PRN; Protocol; 18 UNITS/KG/HR


   PRN Reason: ADJUST RATE PER PROTOCOL


   Last Titration: 05/17/18 08:07 Dose:  20.29 units/kg/hr, 10.4 mls/hr


Vancomycin HCl (Vancomycin 1gm)  1 gm in 250 mls @ 167 mls/hr IVPB DAILY TD


   PRN Reason: Protocol


   Last Admin: 05/17/18 09:44 Dose:  167 mls/hr


Cefepime HCl (Maxipime 2gm)  2 gm in 100 mls @ 100 mls/hr IVPB Q12 TD


   PRN Reason: Protocol


   Stop: 05/21/18 01:46


Iron Sucrose 200 mg/ Sodium (Chloride)  110 mls @ 110 mls/hr IVPB ONCE ONE


   Stop: 05/17/18 17:12


Pantoprazole Sodium (Protonix Ec Tab)  40 mg PO 0600 [facility]


   Last Admin: 05/17/18 05:04 Dose:  40 mg


Sodium Chloride (Sodium Chloride 7% Inhalation)  4 ml IH QIDRESP TD


Trimethoprim/Sulfamethoxazole (Bactrim Ss Tab)  1 tab PO DAILY TD


   PRN Reason: Protocol


   Last Admin: 05/17/18 09:39 Dose:  1 tab











- Labs


Labs: 


 





 05/17/18 05:30 





 05/17/18 05:30 





 











PT  13.9 SECONDS (9.4-12.5)  H  05/15/18  18:55    


 


INR  1.21  (0.93-1.08)  H  05/15/18  18:55    


 


APTT  77.6 Seconds (25.1-36.5)  H  05/17/18  14:05    














Attending/Attestation





- Attestation


I have personally seen and examined this patient.: Yes


I have fully participated in the care of the patient.: Yes


I have reviewed all pertinent clinical information, including history, physical 

exam and plan: Yes


Notes (Text): 





05/17/18 16:25





Attending note;





Patient seen and examined with resident.





Patient is a 52 year old  woman with a history of asthma and HIV (last 

CD4 count unknown), who developed sudden SOB and dry cough after traveling on a 

flight from [facility] to [place] 3 days ago. She also reports having been 

diagnosed and treated for malaria 3 weeks ago while in [facility].


VQ scan showed intermediate probability for PE. Reviewed with the radiologist.


Currently on IV heparin drip.


Doppler lower extremities negative.


Echocardiogram showed moderate LV dysfunction with severe pulmonary 

hypertension and IVC thrombus.


 cardiology evaluation appreciated.





Anemia; mostly secondary to chronic HIV and  iron deficiency anemia. Started on 

IV iron.


Hematology evaluation requested.





History of HIV; does not know her CD4 count. Was not on medication over a year. 

CD4 count ordered.





Hepatitis B surface antigen positive.





Weight loss; mostly secondary to advanced HIV. Pending CD4 count. Dietitian 

evaluation requested.





Rule out tuberculosis; patient does not have any cough or sputum production. CT 

chest showed multifocal pneumonia and right pyelonephritis.


Case discussed with ID in detail.


We can DC isolation if 1 AFB is negative.


Pulmonary evaluation appreciated.





Currently on IV vancomycin and cefepime.


Blood culture is negative


Urine culture is negative. Legionella was negative.





History of malaria; treated recently in [facility]. Follow-up peripheral smear.





 evaluation requested.





Upon discharge the patient will be referred [place]  [facility] [place]. Patient will be 

referred to HIV clinic/[facility].


05/17/18 16:33

## 2018-05-17 NOTE — PN
DATE:  05/17/2018



LOCATION:  The patient seen in room 264, bed 2.



SUBJECTIVE:  The patient is seen earlier today.  Temperature is down. 

Uneventful night.



PHYSICAL EXAMINATION:

VITAL SIGNS:  Temperature is 99.2, T-max yesterday was 100.4, heart rate of

100, blood pressure is 98/60, respiratory rate of 21.

HEENT:  Unremarkable.

NECK:  Supple.

LUNGS:  Have decreased breath sounds.

HEART:  Normal S1, S2.

ABDOMEN:  Soft, nontender.  No rebound or guarding.



LABORATORY EXAMINATION:  Reveals a white count of 7.3, hemoglobin of 7,

platelets of 281.  BUN of 18, creatinine of 1.1.  Procalcitonin is elevated

at 1.38.  Urinalysis is noted.  Urine for Legionella antigen is negative. 

HIV is positive.  Hepatitis C is negative.  Hepatitis B surface antigen is

reactive.  Hepatitis B core IgM antibody is negative.  Blood cultures are

reported to be negative.  Urine culture is reported to be negative.  CAT

scan of the chest is reviewed.  Malaria smears are negative and pathology.



ASSESSMENT AND PLAN:  A 52-year-old female who is  from [facility] who

is positive human immunodeficiency virus, asthma, recent diagnosis of

malaria and now returns with diagnosis of pulmonary emboli with systemic

inflammatory response syndrome, acute kidney injury and bacterial

community-acquired pneumonia and elevated procalcitonin with negative

cultures, awaiting for human immunodeficiency virus polymerase chain

reaction and awaiting for T cells, tuberculosis workup.  We will make

further recommendations upon availability of initial results.





__________________________________________

Ricky Naik MD



DD:  05/17/2018 12:50:04

DT:  05/17/2018 12:53:33

Job # [id number]

## 2018-05-18 LAB
% CD4 (T HELPER CELL): 3 PERCENT (ref 30–61)
% CD8 (SUPPRESSOR T CELL): 63 PERCENT (ref 12–42)
ABSOLUTE CD4 CELLS: 21 CELLS/MCL (ref 490–1740)
ABSOLUTE CD8 CELLS: 400 CELLS/MCL (ref 180–1170)
ABSOLUTE LYMPHOCYTES: 632 CELLS/MCL (ref 850–3900)
ALBUMIN SERPL-MCNC: 2.4 G/DL (ref 3–4.8)
ALBUMIN/GLOB SERPL: 0.5 {RATIO} (ref 1.1–1.8)
ALT SERPL-CCNC: 24 U/L (ref 7–56)
AST SERPL-CCNC: 47 U/L (ref 14–36)
BASOPHILS # BLD AUTO: 0.01 K/MM3 (ref 0–2)
BASOPHILS NFR BLD: 0.2 % (ref 0–3)
BUN SERPL-MCNC: 15 MG/DL (ref 7–21)
CALCIUM SERPL-MCNC: 7.8 MG/DL (ref 8.4–10.5)
EOSINOPHIL # BLD: 0 10*3/UL (ref 0–0.7)
EOSINOPHIL NFR BLD: 0.7 % (ref 1.5–5)
ERYTHROCYTE [DISTWIDTH] IN BLOOD BY AUTOMATED COUNT: 20.7 % (ref 11.5–14.5)
GFR NON-AFRICAN AMERICAN: 52
GRANULOCYTES # BLD: 4.31 10*3/UL (ref 1.4–6.5)
GRANULOCYTES NFR BLD: 72.5 % (ref 50–68)
HELPER/SUPPRESSOR RATIO: 0.05 RATIO (ref 0.86–5)
HGB BLD-MCNC: 7 G/DL (ref 12–16)
LYMPHOCYTES # BLD: 0.9 10*3/UL (ref 1.2–3.4)
LYMPHOCYTES NFR BLD AUTO: 15 % (ref 22–35)
MCH RBC QN AUTO: 25.9 PG (ref 25–35)
MCH RBC QN AUTO: 27.4 PG (ref 27–33)
MCHC RBC AUTO-ENTMCNC: 31.1 G/DL (ref 31–37)
MCV RBC AUTO: 83.3 FL (ref 80–105)
MCV RBC: 84.3 FL (ref 80–100)
MONOCYTES # BLD AUTO: 0.7 10*3/UL (ref 0.1–0.6)
MONOCYTES NFR BLD: 11.6 % (ref 1–6)
PLATELET # BLD: 263 10^3/UL (ref 120–450)
PMV BLD AUTO: 8.9 FL (ref 7–11)
RBC # BLD AUTO: 2.7 10^6/UL (ref 3.5–6.1)
WBC # BLD AUTO: 5.9 10^3/UL (ref 4.5–11)

## 2018-05-18 PROCEDURE — 30233N1 TRANSFUSION OF NONAUTOLOGOUS RED BLOOD CELLS INTO PERIPHERAL VEIN, PERCUTANEOUS APPROACH: ICD-10-PCS | Performed by: INTERNAL MEDICINE

## 2018-05-18 RX ADMIN — CEFEPIME SCH MLS/HR: 1 INJECTION, SOLUTION INTRAVENOUS at 23:22

## 2018-05-18 RX ADMIN — VANCOMYCIN HYDROCHLORIDE SCH MLS/HR: 1 INJECTION, POWDER, LYOPHILIZED, FOR SOLUTION INTRAVENOUS at 09:34

## 2018-05-18 RX ADMIN — CEFEPIME SCH MLS/HR: 1 INJECTION, SOLUTION INTRAVENOUS at 09:34

## 2018-05-18 RX ADMIN — HEPARIN SODIUM PRN MLS/HR: 10000 INJECTION, SOLUTION INTRAVENOUS at 10:25

## 2018-05-18 RX ADMIN — PANTOPRAZOLE SODIUM SCH MG: 40 TABLET, DELAYED RELEASE ORAL at 05:48

## 2018-05-18 RX ADMIN — SULFAMETHOXAZOLE AND TRIMETHOPRIM SCH TAB: 400; 80 TABLET ORAL at 09:33

## 2018-05-18 NOTE — CP.PCM.PN
<Jeet Terrazas - Last Filed: 05/18/18 16:39>





Subjective





- Date & Time of Evaluation


Date of Evaluation: 05/18/18


Time of Evaluation: 06:55





- Subjective


Subjective: 





PGY1 Medicine Note for Dr. Negrete





Patient seen and examined at bedside. No acute events overnight. Patient is 

feeling well. She is still on contact precautions waiting for AFB cultures to 

return. She has been able to walk around her room without getting lightheaded 

or dizzy. She is no longer experiencing chills or right sided back/flank pain. 

She is tolerating her diet, having normal bowel movements without any nausea or 

vomiting. She has no complaints at this time. Denies nausea, vomiting, diarrhea

, constipation, chest pain, shortness of breath, palpitations, abdominal pain, 

headaches, vision changes, numbness or tingling. 





Objective





- Vital Signs/Intake and Output


Vital Signs (last 24 hours): 


 











Temp Pulse Resp BP Pulse Ox


 


 98.1 F   93 H  20   95/54 L  97 


 


 05/18/18 06:00  05/18/18 06:00  05/18/18 06:00  05/18/18 06:00  05/18/18 06:00








Intake and Output: 


 











 05/18/18 05/18/18





 06:59 18:59


 


Intake Total 770 90


 


Balance 770 90














- Medications


Medications: 


 Current Medications





Heparin Sodium/Sodium Chloride (Heparin 34987 Units/250ml 1/2 Normal Saline)  25

,000 units in 250 mls @ 9.226 mls/hr IV .Q24H PRN; Protocol; 18 UNITS/KG/HR


   PRN Reason: ADJUST RATE PER PROTOCOL


   Last Admin: 05/18/18 10:25 Dose:  17.29 units/kg/hr, 8.862 mls/hr


Cefepime HCl (Maxipime 2gm)  2 gm in 100 mls @ 100 mls/hr IVPB Q12 TD


   PRN Reason: Protocol


   Stop: 05/21/18 01:46


   Last Admin: 05/18/18 09:34 Dose:  100 mls/hr


Pantoprazole Sodium (Protonix Ec Tab)  40 mg PO 0600 TD


   Last Admin: 05/18/18 05:48 Dose:  40 mg


Sodium Chloride (Sodium Chloride 7% Inhalation)  4 ml IH QIDRESP [facility]


Trimethoprim/Sulfamethoxazole (Bactrim Ss Tab)  1 tab PO DAILY TD


   PRN Reason: Protocol


   Last Admin: 05/18/18 09:33 Dose:  1 tab











- Labs


Labs: 


 





 05/18/18 03:25 





 05/18/18 03:25 





 











PT  13.9 SECONDS (9.4-12.5)  H  05/15/18  18:55    


 


INR  1.21  (0.93-1.08)  H  05/15/18  18:55    


 


APTT  69.7 Seconds (25.1-36.5)  H  05/18/18  09:30    














- Constitutional


Appears: Non-toxic, No Acute Distress





- Head Exam


Head Exam: ATRAUMATIC, NORMOCEPHALIC





- Eye Exam


Eye Exam: EOMI, Normal appearance





- ENT Exam


ENT Exam: Mucous Membranes Moist





- Neck Exam


Neck Exam: absent: Lymphadenopathy





- Respiratory Exam


Respiratory Exam: Clear to Ausculation Bilateral, NORMAL BREATHING PATTERN.  

absent: Accessory Muscle Use, Rales, Rhonchi, Wheezes, Respiratory Distress





- Cardiovascular Exam


Cardiovascular Exam: REGULAR RHYTHM, +S1, +S2





- GI/Abdominal Exam


GI & Abdominal Exam: Soft, Normal Bowel Sounds.  absent: Distended, Firm, 

Guarding, Rigid, Tenderness





- Extremities Exam


Extremities Exam: absent: Calf Tenderness, Pedal Edema





- Back Exam


Back Exam: NORMAL INSPECTION.  absent: CVA tenderness (L), CVA tenderness (R), 

paraspinal tenderness





- Neurological Exam


Neurological Exam: Alert, Awake, CN II-XII Intact, Oriented x3





- Psychiatric Exam


Psychiatric exam: Normal Affect, Normal Mood





- Skin


Skin Exam: Dry, Warm





Assessment and Plan





- Assessment and Plan (Free Text)


Assessment: 





52 year old  female with past medical history that includes Asthma, HIV 

with unknown viral load or CD4 count, and recent diagnosis and treatment for 

malaria who presents to [facility] complaining of shortness of breath with left 

sided pulmonary embolism, HIV, multifocal pneumonia, hydronephrosis and 

pyelonephrosis.


Plan: 





Pulmonary Embolism


- Respiratory Consulted, Dr. Mckinlye - help appreciated


- Hx of recent long airflight, HIV


- D-Dimer 900+, V/Q scan showing moderate sized perfusion defect involving the 

left upper lobe, intermediate probability study


   - No CTA due to patient's estimated GFR of 52 and TIMO.


- CXR: no acute disease


- Echocardiogram - EF ~50%, Moderate MR, moderate TR, severe pulmonary HTN. 

Right ventricle moderately dilated, right atrium severely dilated.


- PT/INR


- Heparin gtt - bridge to coumadin 


- Coumadin 5mg PO daily to stat tonight





Lung Infiltrates, probable multi-focal pneumonia r/o TB


- Chest CT w/o - Multi focal pneumonia. Right-sided hydronephrosis and 

perinephric stranding.  Possible pyelonephritis


- CXR: no acute disease


- Blood Culture - negative at 48 hours x 2


- Urine Culture - negative


- Procalcitonin 1.38


- Legionella - negative


- Strep pneuo - f/u


- Alpha-1 antitrypsin - 244 (high)


- Vanco 1gm IVPB daily (started 5/16)


- Cefepime 2gm IVPB q12h (started 5/16, last dose 5/21)


- Bactrim SS 1 tab PO daily (started 5/16)


- Started on Clarithromycin





- Patient currently on droplet precautions, can d/c after 1 AFB as TB is not 

likely. Patient is not coughing up any blood and there is no radiographic 

evidence in correlation with TB. 


   - AFB - negative x2 - isolation dc'd


   - Quat Gold - f/u





AIDS/HIV


- CD4 count 21


- CD8 count 400


- Viral load 6.28


- Not currently on HAART therapy


- Bactrim for PCP ppx 


- Started on Clarithromycin


- ID consult





TIMO 


- Cr 1.3 upon admission -->  Cr 1.1 today


- Likely volume depleted 2/2 patient poor appetitie


- Holding IVF hydration at this time 2/2 sxs of CHF


- Monitor in AM





Hx of Malaria


- Recent inpatient treatment for 2 weeks in [facility] for Malaria, afebrile


- Patient unable to recall medication regiment


- Peripheral smear - no parasites seen


- Haptoglobin 251.5, Reticulocyte count 2.35


- Malaria specific studies 





Anemia - Normocytic


- Hem consulted, Dr. Meraz


- Hgb 7.0 - transfused 1 unit of pRBCs


- Patient borderline hypotensive, with some tachycardia


- CBC in AM


- Consider transfusion in light of potential cardiac disease 


- Iron 27, TIBC 179, Ferritin 1570, Percent sat 15%, Folate 9.9, VB12 - 907


- Retic Count 2.35


- Haptoglobin 251.5





Elevated BNP


- 2430 pro BNP


- Cardiology consult


- Echocardiogram - EF ~50%, Moderate MR, moderate TR, severe pulmonary HTN. 

Right ventricle moderately dilated, right atrium severely dilated. 





Hx of Hep B


- Hep Bs ag positive


- Hep B core IgM Ab negative





DVT ppx: Heparin gtt


GI ppx: Protonix





Case discussed with Dr. Penelope Terrazas PGY1





<Colt Negrete - Last Filed: 05/18/18 17:58>





Objective





- Vital Signs/Intake and Output


Vital Signs (last 24 hours): 


 











Temp Pulse Resp BP Pulse Ox


 


 98.1 F   93 H  20   95/54 L  97 


 


 05/18/18 06:00  05/18/18 06:00  05/18/18 06:00  05/18/18 06:00  05/18/18 06:00








Intake and Output: 


 











 05/18/18 05/18/18





 06:59 18:59


 


Intake Total 770 90


 


Balance 770 90














- Medications


Medications: 


 Current Medications





Azithromycin (Zithromax)  1,250 mg PO MON TD


   PRN Reason: Protocol


Clarithromycin (Biaxin Filmtab)  1,250 mg PO STAT TD


   PRN Reason: Protocol


   Last Admin: 05/18/18 15:53 Dose:  1,250 mg


Heparin Sodium/Sodium Chloride (Heparin 47677 Units/250ml 1/2 Normal Saline)  25

,000 units in 250 mls @ 9.226 mls/hr IV .Q24H PRN; Protocol; 18 UNITS/KG/HR


   PRN Reason: ADJUST RATE PER PROTOCOL


   Last Admin: 05/18/18 10:25 Dose:  17.29 units/kg/hr, 8.862 mls/hr


Cefepime HCl (Maxipime 2gm)  2 gm in 100 mls @ 100 mls/hr IVPB Q12 TD


   PRN Reason: Protocol


   Stop: 05/21/18 01:46


   Last Admin: 05/18/18 09:34 Dose:  100 mls/hr


Pantoprazole Sodium (Protonix Ec Tab)  40 mg PO 0600 TD


   Last Admin: 05/18/18 05:48 Dose:  40 mg


Sodium Chloride (Sodium Chloride 7% Inhalation)  4 ml IH QIDRESP [facility]


Trimethoprim/Sulfamethoxazole (Bactrim Ss Tab)  1 tab PO DAILY TD


   PRN Reason: Protocol


   Last Admin: 05/18/18 09:33 Dose:  1 tab


Warfarin Sodium (Coumadin)  5 mg PO 1800 TD


   PRN Reason: Protocol











- Labs


Labs: 


 





 05/18/18 03:25 





 05/18/18 03:25 





 











PT  13.9 SECONDS (9.4-12.5)  H  05/15/18  18:55    


 


INR  1.21  (0.93-1.08)  H  05/15/18  18:55    


 


APTT  77.7 Seconds (25.1-36.5)  H  05/18/18  15:50    














Attending/Attestation





- Attestation


I have personally seen and examined this patient.: Yes


I have fully participated in the care of the patient.: Yes


I have reviewed all pertinent clinical information, including history, physical 

exam and plan: Yes


Notes (Text): 





05/18/18 17:55








Attending note;





Patient seen and examined with resident.





Patient is a 52 year old  woman with a history of asthma and HIV (last 

CD4 count unknown), who developed sudden SOB and dry cough after traveling on a 

flight from [facility] to [place]. She also reports having been diagnosed and 

treated for malaria 3 weeks ago while in [facility].


VQ scan showed intermediate probability for PE. Reviewed with the radiologist.


Currently on IV heparin drip.


Doppler lower extremities negative.


Echocardiogram showed moderate LV dysfunction with severe pulmonary 

hypertension and IVC thrombus.


Case discussed with cardiology Dr. Wylie in detail.


Started on by mouth Coumadin today.





Anemia; mostly secondary to chronic HIV/AIDS.


Case discussed with Dr. Meraz in detail.


Patient needs lifelong anticoagulation.





Anemia; hemoglobin is 7.0. 1 unit PRBC transfusion ordered.





CD4 count is 21. Patient is started on PCP and Mac prophylaxis.


Hepatitis B surface antigen positive.


Patient will be referred to comprehensive HIV clinic upon discharge.





Weight loss; mostly secondary to advanced HIV. Dietitian evaluation appreciated.





Rule out tuberculosis; patient does not have any cough or sputum production. CT 

chest showed multifocal pneumonia and right pyelonephritis.


Case discussed with ID in detail.


AFB 2 negative. Isolation discontinued.





Currently on IV cefepime.


Blood culture is negative


Urine culture is negative. Legionella was negative.





History of malaria; treated recently in [facility]. Peripheral smear is negative 

for malaria.





 evaluation requested.





Upon discharge the patient will be referred to  [facility] clinic. Patient will be 

referred to HIV clinic/[facility].

## 2018-05-18 NOTE — PN
DATE:  05/18/2018



CARDIOLOGY FOLLOWUP



SUBJECTIVE:  The patient is in no acute distress.



OBJECTIVE:

VITAL SIGNS:  Blood pressure is 95/54, heart rate is in the 90s.

NECK:  Negative JVD.

LUNGS:  Decreased breath sounds.

HEART:  Reveals S1, S2.

EXTREMITIES:  Without change.



LABORATORY DATA:  Hemoglobin is 7.  Chemistries, BUN and creatinine are 15

and 1.1.



Echocardiogram reveals preserved LV function with severe pulmonary

hypertension.



IMPRESSION:

1.  Questionable pulmonary embolism.

2.  Questionable hypercoagulable state.

3.  Severe pulmonary hypertension.

4.  Human immunodeficiency virus positive.

5.  History of malaria.



PLAN:  Given these findings, the patient's marked anemia makes

anticoagulation an issue.



The question is the patient's hypercoagulable state and she needs a CT scan

with contrast to document definitively whether she actually has a pulmonary

embolism.  If Hematology determines that she needs anticoagulation for

life, then we would skip the CT scan.  We have no explanation of why the

patient has such severe pulmonary hypertension.





__________________________________________

Ru Wylie MD



DD:  05/18/2018 14:14:29

DT:  05/18/2018 14:17:35

Job # [id number]

## 2018-05-18 NOTE — CON
DATE:  05/17/2018



HEMATOLOGY CONSULTATION



This is a 52-year-old woman who has many medical problems including

infection, including shortness of breath, including HIV untreated, and

specifically being called for in terms of the anemia and her

anticoagulation for her pulmonary emboli.  In terms of the anemia, the

patient has a hemoglobin of 7 and more generally, the laboratory finding of

that of 7.38 with an MCV of 83, normal differential, retic count is 2.3,

haptoglobin is 251, which is essentially normal.  The chemistries also show

normal GFR, normal liver function test.  The presented iron saturation is

15, which is low.  TIBC-transferrin is 100.  She has multiple medical

problems.  She is also having anemia at this point.  I would add probably

iron as p.o. ferrous fumarate, which is easier to tolerate, once a day.  I

am checking for the thyroid and for hemoglobin _____ B12 level.  Hold the

bone marrow aspiration.  The second issue is her anticoagulation and she

has not been compliant with her other medications and so what I would

recommend that she will be put on Coumadin on a weekly basis with enough

Coumadin to follow her for a week, so that she could be checked and the

Coumadin levels are ordered.  If we give her Eliquis or Xarelto, she very

well may not return on time and may not be taking it in the correct way and

not be monitored in correct way.  Hence, I would recommend Coumadin as an

outpatient with Coumadin refills literally once a week until it is proven

that she is in good level and that she is compliant.  Further more, she has

so many other medical problems that she is going to be needed to be

followed any way in terms of her anemia, in terms of her infections, and in

terms of her breathing.  So, at this point, I would recommend that suppose

if we are giving something like Eliquis for which _____.  Further more in

terms of transfusion, she could be transfused as needed for hemodynamic

stability and I will give her a trial of iron, ferrous fumarate for 1 month

and we will evaluate.





__________________________________________

Uri Meraz MD



DD:  05/17/2018 14:33:02

DT:  05/18/2018 0:30:11

Job # [id number]

## 2018-05-18 NOTE — CP.PCM.PN
Subjective





- Date & Time of Evaluation


Date of Evaluation: 05/18/18


Time of Evaluation: 12:35





- Subjective


Subjective: 





Patient feels weak but a little better, cough is slowly improving, still with 

poor appetite. No nausea, no diarrhea.





Objective





- Vital Signs/Intake and Output


Vital Signs (last 24 hours): 


 











Temp Pulse Resp BP Pulse Ox


 


 98.1 F   93 H  20   95/54 L  97 


 


 05/18/18 06:00  05/18/18 06:00  05/18/18 06:00  05/18/18 06:00  05/18/18 06:00








Intake and Output: 


 











 05/18/18 05/18/18





 06:59 18:59


 


Intake Total 770 90


 


Balance 770 90














- Medications


Medications: 


 Current Medications





Clarithromycin (Biaxin Filmtab)  1,250 mg PO STAT TD


   PRN Reason: Protocol


Heparin Sodium/Sodium Chloride (Heparin 19037 Units/250ml 1/2 Normal Saline)  25

,000 units in 250 mls @ 9.226 mls/hr IV .Q24H PRN; Protocol; 18 UNITS/KG/HR


   PRN Reason: ADJUST RATE PER PROTOCOL


   Last Admin: 05/18/18 10:25 Dose:  17.29 units/kg/hr, 8.862 mls/hr


Cefepime HCl (Maxipime 2gm)  2 gm in 100 mls @ 100 mls/hr IVPB Q12 TD


   PRN Reason: Protocol


   Stop: 05/21/18 01:46


   Last Admin: 05/18/18 09:34 Dose:  100 mls/hr


Pantoprazole Sodium (Protonix Ec Tab)  40 mg PO 0600 [facility]


   Last Admin: 05/18/18 05:48 Dose:  40 mg


Sodium Chloride (Sodium Chloride 7% Inhalation)  4 ml IH QIDRESP [facility]


Trimethoprim/Sulfamethoxazole (Bactrim Ss Tab)  1 tab PO DAILY TD


   PRN Reason: Protocol


   Last Admin: 05/18/18 09:33 Dose:  1 tab


Warfarin Sodium (Coumadin)  5 mg PO 1800 TD


   PRN Reason: Protocol











- Labs


Labs: 


 





 05/18/18 03:25 





 05/18/18 03:25 





 











PT  13.9 SECONDS (9.4-12.5)  H  05/15/18  18:55    


 


INR  1.21  (0.93-1.08)  H  05/15/18  18:55    


 


APTT  69.7 Seconds (25.1-36.5)  H  05/18/18  09:30    














- Constitutional


Appears: Cachectic, Chronically Ill





- Head Exam


Head Exam: NORMAL INSPECTION





- ENT Exam


ENT Exam: Mucous Membranes Moist





- Neck Exam


Neck Exam: absent: Lymphadenopathy, Meningismus





- Respiratory Exam


Respiratory Exam: Decreased Breath Sounds





- Cardiovascular Exam


Cardiovascular Exam: +S1, +S2





- GI/Abdominal Exam


GI & Abdominal Exam: Soft.  absent: Tenderness





Assessment and Plan





- Assessment and Plan (Free Text)


Plan: 





Assessment


sepsis due to multifocal pneumonia in this patient with HIV/AIDS current CD4 

count 21, R/O TB


HBsAg positive, chronic hepatitis B infection


asthma


history of malaria





Plan


Will get HBV DNA PCR, serum crypt Ag, RPR


will continue Cefepime for now and follow up sputum AFB


cultures have been negative x 2 days


will continue PJP prophylaxis and add GILBERTO prophylaxis


patient will need to be started on ART soon

## 2018-05-19 VITALS — RESPIRATION RATE: 18 BRPM

## 2018-05-19 LAB
ALBUMIN SERPL-MCNC: 2.2 G/DL (ref 3–4.8)
ALBUMIN/GLOB SERPL: 0.5 {RATIO} (ref 1.1–1.8)
ALT SERPL-CCNC: 26 U/L (ref 7–56)
AST SERPL-CCNC: 64 U/L (ref 14–36)
BASOPHILS # BLD AUTO: 0 K/MM3 (ref 0–2)
BASOPHILS NFR BLD: 0 % (ref 0–3)
BUN SERPL-MCNC: 12 MG/DL (ref 7–21)
CALCIUM SERPL-MCNC: 7.5 MG/DL (ref 8.4–10.5)
EOSINOPHIL # BLD: 0.1 10*3/UL (ref 0–0.7)
EOSINOPHIL NFR BLD: 1 % (ref 1.5–5)
ERYTHROCYTE [DISTWIDTH] IN BLOOD BY AUTOMATED COUNT: 19.3 % (ref 11.5–14.5)
GFR NON-AFRICAN AMERICAN: 58
GRANULOCYTES # BLD: 4.35 10*3/UL (ref 1.4–6.5)
GRANULOCYTES NFR BLD: 72 % (ref 50–68)
HGB BLD-MCNC: 8.5 G/DL (ref 12–16)
INR PPP: 1.38 (ref 0.93–1.08)
LYMPHOCYTES # BLD: 0.9 10*3/UL (ref 1.2–3.4)
LYMPHOCYTES NFR BLD AUTO: 14.9 % (ref 22–35)
MCH RBC QN AUTO: 26.7 PG (ref 25–35)
MCHC RBC AUTO-ENTMCNC: 32.1 G/DL (ref 31–37)
MCV RBC AUTO: 83.3 FL (ref 80–105)
MONOCYTES # BLD AUTO: 0.7 10*3/UL (ref 0.1–0.6)
MONOCYTES NFR BLD: 12.1 % (ref 1–6)
PLATELET # BLD: 254 10^3/UL (ref 120–450)
PMV BLD AUTO: 9.2 FL (ref 7–11)
PROTHROMBIN TIME: 16 SECONDS (ref 9.4–12.5)
RBC # BLD AUTO: 3.18 10^6/UL (ref 3.5–6.1)
TB ANTIGEN MINUS NIL: (no result) IU/ML
WBC # BLD AUTO: 6 10^3/UL (ref 4.5–11)

## 2018-05-19 RX ADMIN — CEFEPIME SCH MLS/HR: 1 INJECTION, SOLUTION INTRAVENOUS at 09:57

## 2018-05-19 RX ADMIN — CEFEPIME SCH MLS/HR: 1 INJECTION, SOLUTION INTRAVENOUS at 22:04

## 2018-05-19 RX ADMIN — PANTOPRAZOLE SODIUM SCH MG: 40 TABLET, DELAYED RELEASE ORAL at 06:37

## 2018-05-19 RX ADMIN — HEPARIN SODIUM PRN MLS/HR: 10000 INJECTION, SOLUTION INTRAVENOUS at 18:04

## 2018-05-19 RX ADMIN — SULFAMETHOXAZOLE AND TRIMETHOPRIM SCH TAB: 400; 80 TABLET ORAL at 09:57

## 2018-05-19 NOTE — CP.PCM.PN
<Jeet Terrazas - Last Filed: 05/19/18 16:20>





Subjective





- Date & Time of Evaluation


Date of Evaluation: 05/19/18


Time of Evaluation: 15:13





- Subjective


Subjective: 





PGY1 Medicine Note for Dr. Negrete





Patient seen and examined at bedside. No acute events overnight. Patient is 

feeling well and is happy to be off of isolation. She feels much stronger and 

has a lot more energy after receiving a blood transfusion yesterday. She is 

tolerating her diet but stating she is having difficulty eating her entire meal 

due to feeling full/bloated. She having normal bowel movements. She has no 

complaints at this time. Denies fevers, chills, nausea, vomiting, diarrhea, 

constipation, chest pain, shortness of breath, palpitations, abdominal pain, 

headaches, vision changes, numbness or tingling. 





Objective





- Vital Signs/Intake and Output


Vital Signs (last 24 hours): 


 











Temp Pulse Resp BP Pulse Ox


 


 98 F   81   18   83/46 L  98 


 


 05/19/18 07:48  05/19/18 07:48  05/19/18 07:48  05/19/18 07:48  05/19/18 07:48








Intake and Output: 


 











 05/19/18 05/19/18





 06:59 18:59


 


Intake Total 426 150


 


Balance 426 150














- Medications


Medications: 


 Current Medications





Azithromycin (Zithromax)  1,250 mg PO MON TD


   PRN Reason: Protocol


Clarithromycin (Biaxin Filmtab)  1,250 mg PO STAT TD


   PRN Reason: Protocol


   Last Admin: 05/19/18 14:59 Dose:  1,250 mg


Heparin Sodium/Sodium Chloride (Heparin 43071 Units/250ml 1/2 Normal Saline)  25

,000 units in 250 mls @ 9.226 mls/hr IV .Q24H PRN; Protocol; 18 UNITS/KG/HR


   PRN Reason: ADJUST RATE PER PROTOCOL


   Last Titration: 05/19/18 10:43 Dose:  15.41 units/kg/hr, 7.9 mls/hr


Cefepime HCl (Maxipime 2gm)  2 gm in 100 mls @ 100 mls/hr IVPB Q12 TD


   PRN Reason: Protocol


   Stop: 05/21/18 01:46


   Last Admin: 05/19/18 09:57 Dose:  100 mls/hr


Pantoprazole Sodium (Protonix Ec Tab)  40 mg PO 0600 [facility]


   Last Admin: 05/19/18 06:37 Dose:  40 mg


Sodium Chloride (Sodium Chloride 7% Inhalation)  4 ml IH QIDRESP [facility]


Trimethoprim/Sulfamethoxazole (Bactrim Ss Tab)  1 tab PO DAILY [facility]


   PRN Reason: Protocol


   Last Admin: 05/19/18 09:57 Dose:  1 tab


Warfarin Sodium (Coumadin)  5 mg PO 1800 [facility]


   PRN Reason: Protocol


   Last Admin: 05/18/18 18:05 Dose:  5 mg











- Labs


Labs: 


 





 05/19/18 07:00 





 05/19/18 07:00 





 











PT  16.0 SECONDS (9.4-12.5)  H  05/19/18  10:15    


 


INR  1.38  (0.93-1.08)  H  05/19/18  10:15    


 


APTT  84.0 Seconds (25.1-36.5)  H  05/19/18  08:40    














- Constitutional


Appears: Non-toxic, No Acute Distress





- Head Exam


Head Exam: ATRAUMATIC, NORMOCEPHALIC





- Eye Exam


Eye Exam: EOMI, Normal appearance





- ENT Exam


ENT Exam: Mucous Membranes Moist





- Neck Exam


Neck Exam: absent: Lymphadenopathy, Tenderness





- Respiratory Exam


Respiratory Exam: Decreased Breath Sounds (decreased in right base), NORMAL 

BREATHING PATTERN.  absent: Accessory Muscle Use, Rales, Rhonchi, Wheezes, 

Respiratory Distress





- Cardiovascular Exam


Cardiovascular Exam: REGULAR RHYTHM, +S1, +S2





- GI/Abdominal Exam


GI & Abdominal Exam: Soft, Normal Bowel Sounds





- Extremities Exam


Extremities Exam: absent: Calf Tenderness, Pedal Edema





- Back Exam


Back Exam: absent: CVA tenderness (L), CVA tenderness (R), paraspinal tenderness

, vertebral tenderness





- Neurological Exam


Neurological Exam: Alert, Awake, CN II-XII Intact, Oriented x3


Neuro motor strength exam: Left Upper Extremity: 5, Right Upper Extremity: 5, 

Left Lower Extremity: 5, Right Lower Extremity: 5





- Psychiatric Exam


Psychiatric exam: Normal Affect, Normal Mood





- Skin


Skin Exam: Dry, Warm





Assessment and Plan





- Assessment and Plan (Free Text)


Assessment: 





52 year old  female with past medical history that includes Asthma, AIDS

, and recent diagnosis and treatment for malaria who presents to [facility] ED 

complaining of shortness of breath with left sided pulmonary embolism, HIV, 

multifocal pneumonia, hydronephrosis and pyelonephrosis.


Plan: 





Pulmonary Embolism


- Respiratory Consulted, Dr. Mckinley - help appreciated


- Hx of recent long airflight, HIV


- D-Dimer 900+, V/Q scan showing moderate sized perfusion defect involving the 

left upper lobe, intermediate probability study


   - No CTA due to patient's estimated GFR of 52 and TIMO.


- CXR: no acute disease


- Echocardiogram - EF ~50%, Moderate MR, moderate TR, severe pulmonary HTN. 

Right ventricle moderately dilated, right atrium severely dilated.


- Lower extremities doppler - negative 


- PT/INR


- Heparin gtt - bridge to coumadin 


- Coumadin 5mg PO daily to stat tonight





Lung Infiltrates, probable multi-focal pneumonia r/o TB


- Chest CT w/o - Multi focal pneumonia. Right-sided hydronephrosis and 

perinephric stranding.  Possible pyelonephritis


- CXR: no acute disease


- Blood Culture - negative at 3 days x 2


- Urine Culture - negative


- Procalcitonin 1.38


- Legionella - negative


- Strep pneuo - f/u


- Alpha-1 antitrypsin - 244 (high)


- Vanco 1gm IVPB daily (started 5/16) - discontinued


- Cefepime 2gm IVPB q12h (started 5/16, last dose 5/21)


- Bactrim SS 1 tab PO daily (started 5/16)


- Azithromycin 1,250 mg PO monday (start on 5/21)





- Patient currently on droplet precautions, can d/c after 1 AFB as TB is not 

likely. Patient is not coughing up any blood and there is no radiographic 

evidence in correlation with TB. 


   - AFB - negative x2 - isolation dc'd


   - Quat Gold - f/u





AIDS/HIV


- ID consulted, Dr. Naik - help appreciated


   - per ID, patient will need to start ART soon.


- CD4 count 21


- CD8 count 400


- Viral load 6.28


- continue Bactrim SS 1 tab PO daily (started 5/16) - PJP prophylaxis


- Azithromycin 1,250 mg PO monday (start on 5/21) - GILBERTO prophylaxis


- Not currently on ART therapy





TIMO 


- Cr 1.3 upon admission -->  Cr 1.0 today


- Likely volume depleted 2/2 patient poor appetitie


- Holding IVF hydration at this time 2/2 sxs of CHF


- Monitor in AM





Hx of Malaria


- Recent inpatient treatment for 2 weeks in [facility] for Malaria, afebrile


- Patient unable to recall medication regiment


- Peripheral smear - no parasites seen


- Haptoglobin 251.5, Reticulocyte count 2.35


- Malaria specific studies 





Anemia - Normocytic


- Hem consulted, Dr. Downs


- Hgb 8.5 


   - transfused 1 unit of pRBCs on 5/18 - responded appropriately


- Patient borderline hypotensive, with some tachycardia


- CBC in AM


- Iron 27, TIBC 179, Ferritin 1570, Percent sat 15%, Folate 9.9, VB12 - 907


- Retic Count 2.35


- Haptoglobin 251.5





Elevated BNP


- 2430 pro BNP


- Cardiology consult


- Echocardiogram - EF ~50%, Moderate MR, moderate TR, severe pulmonary HTN. 

Right ventricle moderately dilated, right atrium severely dilated. 





Hx of Hep B


- Hep Bs ag positive


- Hep B core IgM Ab negative





DVT ppx: Heparin gtt


GI ppx: Protonix





Case discussed with Dr. Penelope Terrazas PGY1





<Colt Negrete - Last Filed: 05/19/18 16:36>





Objective





- Vital Signs/Intake and Output


Vital Signs (last 24 hours): 


 











Temp Pulse Resp BP Pulse Ox


 


 97.3 F L  77   18   97/58 L  100 


 


 05/19/18 15:26  05/19/18 15:26  05/19/18 15:26  05/19/18 15:26  05/19/18 15:26








Intake and Output: 


 











 05/19/18 05/19/18





 06:59 18:59


 


Intake Total 426 1110


 


Balance 426 1110














- Medications


Medications: 


 Current Medications





Azithromycin (Zithromax)  1,250 mg PO MON TD


   PRN Reason: Protocol


Clarithromycin (Biaxin Filmtab)  1,250 mg PO STAT TD


   PRN Reason: Protocol


   Last Admin: 05/19/18 14:59 Dose:  1,250 mg


Heparin Sodium/Sodium Chloride (Heparin 16886 Units/250ml 1/2 Normal Saline)  25

,000 units in 250 mls @ 9.226 mls/hr IV .Q24H PRN; Protocol; 18 UNITS/KG/HR


   PRN Reason: ADJUST RATE PER PROTOCOL


   Last Titration: 05/19/18 10:43 Dose:  15.41 units/kg/hr, 7.9 mls/hr


Cefepime HCl (Maxipime 2gm)  2 gm in 100 mls @ 100 mls/hr IVPB Q12 TD


   PRN Reason: Protocol


   Stop: 05/21/18 01:46


   Last Admin: 05/19/18 09:57 Dose:  100 mls/hr


Pantoprazole Sodium (Protonix Ec Tab)  40 mg PO 0600 [facility]


   Last Admin: 05/19/18 06:37 Dose:  40 mg


Sodium Chloride (Sodium Chloride 7% Inhalation)  4 ml IH QIDRESP TD


Trimethoprim/Sulfamethoxazole (Bactrim Ss Tab)  1 tab PO DAILY [facility]


   PRN Reason: Protocol


   Last Admin: 05/19/18 09:57 Dose:  1 tab


Warfarin Sodium (Coumadin)  5 mg PO 1800 [facility]


   PRN Reason: Protocol


   Last Admin: 05/18/18 18:05 Dose:  5 mg











- Labs


Labs: 


 





 05/19/18 07:00 





 05/19/18 07:00 





 











PT  16.0 SECONDS (9.4-12.5)  H  05/19/18  10:15    


 


INR  1.38  (0.93-1.08)  H  05/19/18  10:15    


 


APTT  84.0 Seconds (25.1-36.5)  H  05/19/18  08:40    














Attending/Attestation





- Attestation


I have personally seen and examined this patient.: Yes


I have fully participated in the care of the patient.: Yes


I have reviewed all pertinent clinical information, including history, physical 

exam and plan: Yes


Notes (Text): 





05/19/18 16:35








Attending note;





Patient seen and examined with resident.





Patient is a 52 year old  woman with a history of asthma and HIV (last 

CD4 count unknown), who developed sudden SOB and dry cough after traveling on a 

flight from [facility] to [place]. She also reports having been diagnosed and 

treated for malaria 3 weeks ago while in [facility].


VQ scan showed intermediate probability for PE. Reviewed with the radiologist.


Currently on IV heparin drip.


Doppler lower extremities negative.


Echocardiogram showed moderate LV dysfunction with severe pulmonary 

hypertension and IVC thrombus.


Case discussed with cardiology Dr. Wylie in detail.


Started on  Coumadin. INR is 1.38.





Anemia; mostly secondary to chronic HIV/AIDS.


Case discussed with Dr. Meraz in detail.


Patient needs lifelong anticoagulation.





Anemia; hemoglobin is 8.5 after1 unit PRBC transfusion.





CD4 count is 21. Patient is started on PCP and Mac prophylaxis.


Hepatitis B surface antigen positive.


Patient will be referred to comprehensive HIV clinic upon discharge.





Weight loss; mostly secondary to advanced HIV. Dietitian evaluation appreciated.





Rule out tuberculosis; patient does not have any cough or sputum production. CT 

chest showed multifocal pneumonia and right pyelonephritis.


Case discussed with ID in detail.


AFB 2 negative. Isolation discontinued.





Currently on IV cefepime.


Blood culture is negative


Urine culture is negative. Legionella was negative.





History of malaria; treated recently in [facility]. Peripheral smear is negative 

for malaria.





 evaluation requested.





Upon discharge the patient will be referred to  [facility] clinic. Patient will be 

referred to HIV clinic/[facility].











05/19/18 16:35

## 2018-05-19 NOTE — PN
DATE:  05/19/2018



SUBJECTIVE:  The patient is seen and examined at bedside.  She is

comfortable.  She talks full sentences.  She is asymptomatic.  She is not

in respiratory or otherwise distress.



PHYSICAL EXAMINATION:

VITAL SIGNS:  Temperature is 98, heart rate 81, blood pressure 83/46 (most

likely erroneous or baseline as the patient's end organ function

substantially improved since admission that she is asymptomatic),

respiratory rate 18, oxygen saturation 98% on room air.

HEENT:  Head and neck atraumatic.

LUNGS:  Clear to auscultation bilaterally.

HEART:  Regular rhythm and rate.  S1, S2 normal.

ABDOMEN:   Soft, nontender, nondistended.

MUSCULOSKELETAL:  No C/C/E, some muscle wasting.

SKIN:  Moist.

PSYCHIATRIC:  The patient is alert, awake and oriented x3.  Normal affect.



LABORATORY DATA:  WBC is 6, hemoglobin 8.5, platelet count 254.  Sodium

139, potassium 3.7, chloride 110, carbon dioxide 22, BUN 12, creatinine 1

down from 1.1, glucose 75, AST 64, ALT 26, total bilirubin is 0.6, albumin

is 2.2.



PTT 84, INR 1.38.  CD-4 count 101.  Hepatitis B profile positive.  HIV

serology positive.



MEDICATIONS:  Azithromycin, cefepime, heparin drip, Protonix, inhaled

hypertonic saline, Bactrim, warfarin.



ASSESSMENT AND PLAN:  This is a 52-year-old lady with human

immunodeficiency virus who presented with acute pulmonary embolism and was

started on therapeutic anticoagulation.  Low-grade fever and chest imaging

lead to suspicion for community-acquired pneumonia and broad-spectrum

antibiotics were started.  At one point, the patient was isolated for

airborne precautions; however, two AFB smears were negative.  The patient

did not have any suspicious clinical features, such as, night sweats, acute

weight loss, recurrent fevers.  Currently, the patient is off of isolation and 
ID

service is following her.  She was started on Bactrim and azithromycin for

infection prophylaxis (CD4 count is 21).  She was also started on Coumadin.  

She will need echocardiogram repeated in about six months after discharge to 
ascertain

resolution of pulmonary hypertension, which at present time is likely

attributed to acute pulmonary embolism. If pulmonary hypertension persists-->
may need 

RH cath with vasoreactivity challenge.  She definitely will need to be

seen by HIV Clinic as an outpatient as well.  We will continue with

gastrointestinal prophylaxis.



ccm time 40 min





__________________________________________

Medardo Mckinley MD





DD:  05/19/2018 11:28:27

DT:  05/19/2018 12:59:20

[facility] # [id number]

BING

## 2018-05-20 LAB
ALBUMIN SERPL-MCNC: 2.5 G/DL (ref 3–4.8)
ALBUMIN/GLOB SERPL: 0.5 {RATIO} (ref 1.1–1.8)
ALT SERPL-CCNC: 25 U/L (ref 7–56)
APTT BLD: 57.2 SECONDS (ref 25.1–36.5)
AST SERPL-CCNC: 57 U/L (ref 14–36)
BASOPHILS # BLD AUTO: 0.01 K/MM3 (ref 0–2)
BASOPHILS NFR BLD: 0.2 % (ref 0–3)
BUN SERPL-MCNC: 11 MG/DL (ref 7–21)
CALCIUM SERPL-MCNC: 7.9 MG/DL (ref 8.4–10.5)
EOSINOPHIL # BLD: 0.1 10*3/UL (ref 0–0.7)
EOSINOPHIL NFR BLD: 1.1 % (ref 1.5–5)
ERYTHROCYTE [DISTWIDTH] IN BLOOD BY AUTOMATED COUNT: 19.7 % (ref 11.5–14.5)
GFR NON-AFRICAN AMERICAN: 58
GRANULOCYTES # BLD: 4.49 10*3/UL (ref 1.4–6.5)
GRANULOCYTES NFR BLD: 69.9 % (ref 50–68)
HGB BLD-MCNC: 8.8 G/DL (ref 12–16)
INR PPP: 1.69 (ref 0.93–1.08)
LYMPHOCYTES # BLD: 1.1 10*3/UL (ref 1.2–3.4)
LYMPHOCYTES NFR BLD AUTO: 17.1 % (ref 22–35)
MCH RBC QN AUTO: 26.5 PG (ref 25–35)
MCHC RBC AUTO-ENTMCNC: 31.3 G/DL (ref 31–37)
MCV RBC AUTO: 84.6 FL (ref 80–105)
MONOCYTES # BLD AUTO: 0.8 10*3/UL (ref 0.1–0.6)
MONOCYTES NFR BLD: 11.7 % (ref 1–6)
PLATELET # BLD: 295 10^3/UL (ref 120–450)
PMV BLD AUTO: 9.4 FL (ref 7–11)
PROTHROMBIN TIME: 19.7 SECONDS (ref 9.4–12.5)
RBC # BLD AUTO: 3.32 10^6/UL (ref 3.5–6.1)
WBC # BLD AUTO: 6.4 10^3/UL (ref 4.5–11)

## 2018-05-20 RX ADMIN — SULFAMETHOXAZOLE AND TRIMETHOPRIM SCH TAB: 400; 80 TABLET ORAL at 12:24

## 2018-05-20 RX ADMIN — PANTOPRAZOLE SODIUM SCH MG: 40 TABLET, DELAYED RELEASE ORAL at 05:57

## 2018-05-20 NOTE — CP.PCM.PN
<Jeet Terrazas - Last Filed: 05/20/18 11:08>





Subjective





- Date & Time of Evaluation


Date of Evaluation: 05/20/18


Time of Evaluation: 07:25





- Subjective


Subjective: 





PGY1 Medicine Note for Dr. Negrete





Patient seen and examined at bedside. No acute events overnight. Patient 

reports that she is tolerating her diet but is having difficulty eating her 

entire meal due to feeling full. She is able to eat approximately half of her 

meals but feels completely full. She is still having normal bowel movement and 

has no nausea, vomiting or abdominal pain. Denies fevers, chills, diarrhea, 

constipation, chest pain, shortness of breath, palpitations, headaches, vision 

changes, numbness or tingling. 








Objective





- Vital Signs/Intake and Output


Vital Signs (last 24 hours): 


 











Temp Pulse Resp BP Pulse Ox


 


 98 F   69   18   91/53 L  92 L


 


 05/20/18 07:33  05/20/18 07:33  05/20/18 07:33  05/20/18 07:33  05/20/18 07:33








Intake and Output: 


 











 05/20/18 05/20/18





 06:59 18:59


 


Intake Total 1090 


 


Balance 1090 














- Medications


Medications: 


 Current Medications





Azithromycin (Zithromax)  1,250 mg PO MON TD


   PRN Reason: Protocol


Clarithromycin (Biaxin Filmtab)  1,250 mg PO STAT [facility]


   PRN Reason: Protocol


   Last Admin: 05/19/18 14:59 Dose:  1,250 mg


Doxycycline Hyclate (Doryx)  100 mg PO Q12 [facility]


   PRN Reason: Protocol


   Stop: 05/25/18 22:01


Heparin Sodium/Sodium Chloride (Heparin 33031 Units/250ml 1/2 Normal Saline)  25

,000 units in 250 mls @ 9.226 mls/hr IV .Q24H PRN; Protocol; 18 UNITS/KG/HR


   PRN Reason: ADJUST RATE PER PROTOCOL


   Last Titration: 05/19/18 19:09 Dose:  Infused


Pantoprazole Sodium (Protonix Ec Tab)  40 mg PO 0600 [facility]


   Last Admin: 05/20/18 05:57 Dose:  40 mg


Sodium Chloride (Sodium Chloride 7% Inhalation)  4 ml IH QIDRESP [facility]


Trimethoprim/Sulfamethoxazole (Bactrim Ss Tab)  1 tab PO DAILY [facility]


   PRN Reason: Protocol


   Last Admin: 05/19/18 09:57 Dose:  1 tab


Warfarin Sodium (Coumadin)  5 mg PO 1800 TD


   PRN Reason: Protocol


   Last Admin: 05/19/18 17:59 Dose:  5 mg











- Labs


Labs: 


 





 05/20/18 07:00 





 05/20/18 07:00 





 











PT  19.7 SECONDS (9.4-12.5)  H  05/20/18  07:00    


 


INR  1.69  (0.93-1.08)  H  05/20/18  07:00    


 


APTT  57.2 Seconds (25.1-36.5)  H  05/20/18  07:00    














- Constitutional


Appears: Non-toxic, No Acute Distress





- Head Exam


Head Exam: ATRAUMATIC, NORMOCEPHALIC





- Eye Exam


Eye Exam: EOMI





- ENT Exam


ENT Exam: Mucous Membranes Moist





- Neck Exam


Neck Exam: absent: Lymphadenopathy





- Respiratory Exam


Respiratory Exam: Decreased Breath Sounds (right base), NORMAL BREATHING 

PATTERN.  absent: Accessory Muscle Use, Rales, Rhonchi





- Cardiovascular Exam


Cardiovascular Exam: REGULAR RHYTHM, +S1, +S2





- GI/Abdominal Exam


GI & Abdominal Exam: Soft, Normal Bowel Sounds.  absent: Distended, Firm, 

Guarding, Rigid, Tenderness





- Extremities Exam


Extremities Exam: absent: Calf Tenderness, Pedal Edema





- Back Exam


Back Exam: NORMAL INSPECTION.  absent: CVA tenderness (L), CVA tenderness (R)





- Neurological Exam


Neurological Exam: Alert, Awake, CN II-XII Intact, Oriented x3


Neuro motor strength exam: Left Upper Extremity: 5, Right Upper Extremity: 5, 

Left Lower Extremity: 5, Right Lower Extremity: 5





- Psychiatric Exam


Psychiatric exam: Normal Affect, Normal Mood





- Skin


Skin Exam: Dry, Warm





Assessment and Plan





- Assessment and Plan (Free Text)


Assessment: 





52 year old  female with past medical history that includes Asthma, AIDS

, and recent diagnosis and treatment for malaria who presents [facility] 

complaining of shortness of breath with left sided pulmonary embolism, HIV, 

multifocal pneumonia, hydronephrosis and pyelonephrosis.


Plan: 





Pulmonary Embolism


- Respiratory Consulted, Dr. Mckinley - help appreciated


- Hx of recent long airflight, HIV


- D-Dimer 900+, V/Q scan showing moderate sized perfusion defect involving the 

left upper lobe, intermediate probability study


   - No CTA due to patient's estimated GFR of 52 and TIMO.


- CXR: no acute disease


- Echocardiogram - EF ~50%, Moderate MR, moderate TR, severe pulmonary HTN. 

Right ventricle moderately dilated, right atrium severely dilated.


- Lower extremities doppler - negative 


- PT/INR


- Heparin gtt - bridge to coumadin 


- Coumadin 5mg PO daily


   - INR 1.69


   - continue to monitor





Lung Infiltrates, probable multi-focal pneumonia r/o TB


- Chest CT w/o - Multi focal pneumonia. Right-sided hydronephrosis and 

perinephric stranding.  Possible pyelonephritis


- CXR: no acute disease


- Blood Culture - negative at 4 days x 2


- Urine Culture - negative


- Procalcitonin 5/16 - 1.38


   - repeat procal 5/18 - 0.72


- Legionella - negative


- Strep pneuo - f/u


- Alpha-1 antitrypsin - 244 (high)


- Vanco 1gm IVPB daily (started 5/16) - discontinued


- Cefepime 2gm IVPB q12h (started 5/16) - discontinued on 5/20


- Bactrim SS 1 tab PO daily (started 5/16)


- Azithromycin 1,250 mg PO monday (start on 5/21)


- Doxycycline 100mg PO q12h (started on 5/20, last dose on 5/25)





- Patient currently on droplet precautions, can d/c after 1 AFB as TB is not 

likely. Patient is not coughing up any blood and there is no radiographic 

evidence in correlation with TB. 


   - AFB - negative x2 - isolation dc'd


   - Quat Gold - not run due to age of specimen 





AIDS/HIV


- ID consulted, Dr. Naik - help appreciated


   - per ID, patient will need to start ART soon.


- CD4 count 21


- CD8 count 400


- Viral load 6.28


- RPR negative


- continue Bactrim SS 1 tab PO daily (started 5/16) - PJP prophylaxis


- Azithromycin 1,250 mg PO monday (start on 5/21) - GILBERTO prophylaxis


- Doxycycline 100mg PO q12h (started on 5/20, last dose on 5/25)


- Not currently on ART therapy





TIMO 


- Cr 1.3 upon admission -->  Cr 1.0 today


- Likely volume depleted 2/2 patient poor appetitie


- Holding IVF hydration at this time 2/2 sxs of CHF


- Monitor in AM





Hx of Malaria


- Recent inpatient treatment for 2 weeks in [facility] for Malaria, afebrile


- Patient unable to recall medication regiment


- Peripheral smear - no parasites seen


- Haptoglobin 251.5, Reticulocyte count 2.35


- Malaria specific studies 





Anemia - Normocytic


- Hem consulted, Dr. Downs


- Hgb 8.8, stable


   - transfused 1 unit of pRBCs on 5/18 - responded appropriately


- Patient borderline hypotensive, with some tachycardia


- Iron 27, TIBC 179, Ferritin 1570, Percent sat 15%, Folate 9.9, VB12 - 907


- Retic Count 2.35


- Haptoglobin 251.5





Elevated BNP


- 2430 pro BNP


- Cardiology consult


- Echocardiogram - EF ~50%, Moderate MR, moderate TR, severe pulmonary HTN. 

Right ventricle moderately dilated, right atrium severely dilated. 





Hx of Hep B


- Hep Bs ag positive


- Hep B core IgM Ab negative


- Hep A/C negative





DVT ppx: Heparin gtt


GI ppx: Protonix





DISPO: Patient is cleared for ID for discharge. Patient will need to be set up 

at infectious disease clinic in order to get medications upon discharge. One 

option is at the [facility] at [facility]. She will also need to be 

enrolled into an ADD program to help her afford her medications. She currently 

does not have insurance but she is a citizen. Her cousin, with whom she lives 

with in [place], is attempting to get her medicaid. Upon discharge patient 

will also need to follow up with the [facility] clinic on Wednesday for an INR 

check. She will also need to follow up one week after that for a second INR 

check. Patient was informed that her disease is very advanced and that follow 

up and medication compliance is very important. She states she understands and 

will make sure to keep all of her appointments and will take her medications. 





Case discussed with Dr. Penelope Marcano Mk PGY1





<Colt Negrete - Last Filed: 05/20/18 13:07>





Objective





- Vital Signs/Intake and Output


Vital Signs (last 24 hours): 


 











Temp Pulse Resp BP Pulse Ox


 


 98 F   69   18   91/53 L  92 L


 


 05/20/18 07:33  05/20/18 07:33  05/20/18 07:33  05/20/18 07:33  05/20/18 07:33








Intake and Output: 


 











 05/20/18 05/20/18





 06:59 18:59


 


Intake Total 1090 


 


Balance 1090 














- Medications


Medications: 


 Current Medications





Azithromycin (Zithromax)  1,250 mg PO MON TD


   PRN Reason: Protocol


Clarithromycin (Biaxin Filmtab)  1,250 mg PO STAT TD


   PRN Reason: Protocol


   Last Admin: 05/19/18 14:59 Dose:  1,250 mg


Doxycycline Hyclate (Doryx)  100 mg PO Q12 TD


   PRN Reason: Protocol


   Stop: 05/25/18 22:01


Heparin Sodium/Sodium Chloride (Heparin 20032 Units/250ml 1/2 Normal Saline)  25

,000 units in 250 mls @ 9.226 mls/hr IV .Q24H PRN; Protocol; 18 UNITS/KG/HR


   PRN Reason: ADJUST RATE PER PROTOCOL


   Last Titration: 05/19/18 19:09 Dose:  Infused


Pantoprazole Sodium (Protonix Ec Tab)  40 mg PO 0600 [facility]


   Last Admin: 05/20/18 05:57 Dose:  40 mg


Sodium Chloride (Sodium Chloride 7% Inhalation)  4 ml IH QIDRESP [facility]


Trimethoprim/Sulfamethoxazole (Bactrim Ss Tab)  1 tab PO DAILY TD


   PRN Reason: Protocol


   Last Admin: 05/20/18 12:24 Dose:  1 tab


Warfarin Sodium (Coumadin)  5 mg PO 1800 TD


   PRN Reason: Protocol


   Last Admin: 05/19/18 17:59 Dose:  5 mg











- Labs


Labs: 


 





 05/20/18 07:00 





 05/20/18 07:00 





 











PT  19.7 SECONDS (9.4-12.5)  H  05/20/18  07:00    


 


INR  1.69  (0.93-1.08)  H  05/20/18  07:00    


 


APTT  57.2 Seconds (25.1-36.5)  H  05/20/18  07:00    














Attending/Attestation





- Attestation


I have personally seen and examined this patient.: Yes


I have fully participated in the care of the patient.: Yes


I have reviewed all pertinent clinical information, including history, physical 

exam and plan: Yes


Notes (Text): 





05/20/18 13:04








Attending note;





Patient seen and examined with resident.





Patient is a 52 year old  woman with a history of asthma and HIV (last 

CD4 count unknown), who developed sudden SOB and dry cough after traveling on a 

flight from [facility] to [place]. She also reports having been diagnosed and 

treated for malaria 3 weeks ago while in [facility].


VQ scan showed intermediate probability for PE. Reviewed with the radiologist.


Currently on IV heparin drip and coumadin.


 INR is 1.6.


Doppler lower extremities negative.


Echocardiogram showed moderate LV dysfunction with severe pulmonary 

hypertension and IVC thrombus.





Anemia; mostly secondary to chronic HIV/AIDS.


Case discussed with Dr. Meraz in detail.


Patient needs lifelong anticoagulation.


Anemia; hemoglobin is 8.8 after1 unit PRBC transfusion.





CD4 count is 21. Patient is started on PCP and Mac prophylaxis.


Hepatitis B surface antigen positive.


Patient will be referred to comprehensive HIV clinic upon discharge.





Rule out tuberculosis; patient does not have any cough or sputum production. CT 

chest showed multifocal pneumonia and right pyelonephritis.


Case discussed with ID in detail.


AFB 2 negative. Isolation discontinued.





Currently on doxycycline.


Blood culture is negative


Urine culture is negative. Legionella was negative.





History of malaria; treated recently in [facility]. Peripheral smear is negative 

for malaria.





 evaluation appreciated.





Possible discharge home tomorrow if INR is therapeutic.


Patient will be closely followed by [facility] for INR checkup.





Upon discharge the patient will be referred to  [facility] clinic. 


Patient will be referred to HIV clinic/[facility].








05/20/18 13:06

## 2018-05-20 NOTE — PN
DATE:  05/20/2018



SUBJECTIVE:  The patient is in bed in no acute distress, nontoxic.



PHYSICAL EXAMINATION

VITAL SIGNS:  Temperature is 98, blood pressure is 91/50, respiratory rate

of 18.

HEENT:  Unremarkable.

NECK:  Supple.

LUNGS:  Have decreased breath sounds.

HEART:  Normal S1 and S2.

ABDOMEN:  Soft, nontender.



LABORATORY DATA:  Reveals a white count of 6.4, hemoglobin of 8, platelets

of 295 and chemistries reveals a BUN of 11, creatinine of 1.  Urinalysis is

noted and percent CD-4 cell count is 3% and serology is noted. 

Microbiology is reviewed.  Review of orders reveals the patient to be on

p.o. Bactrim, azithromycin and cefepime.



ASSESSMENT AND PLAN:  A 52-year-old female originally from [facility]

with sepsis, multifocal bacterial pneumonia with an elevated procalcitonin,

with end-stage acquired immunodeficiency syndrome with T-cells of 21, doubt

tuberculosis and the patient was recently treated for malaria.  The patient

also had hepatitis B surface antigen positive.  Needs HIV care, HIV

medications which also can treat with hepatitis B, pending the hepatitis B

DNA level and at this point, the patient's procalcitonin is much improved

from 1.38 to 0.72 and review of EKG reveals the patient has a QTc of 468. 

The patient has received 5 days of antibiotics.  We will discontinue the

cefepime and complete with p.o. doxycycline.  The  patient is also being

anticoagulated for her pulmonary emboli, doxycycline 100 mg p.o. b.i.d. x5

days.  The patient to follow up either in clinic or HIV clinic.  The

patient to apply for insurance for able to get HIV medications or ADDP.  I

discussed with Dr. Negrete and team.  Zithromax is once weekly for GILBERTO

prophylaxis.  Awaiting for the hepatitis B DNA PCR.





__________________________________________

Ricky Naik MD



DD:  05/20/2018 10:04:18

DT:  05/20/2018 10:07:26

Job # [id number]

## 2018-05-20 NOTE — PCM.RRT
RRT Nurse Assessment





- Situation


Date: 05/20/18


RRT Location:: [address] [place] [address]


Room Number: 272-1

## 2018-05-21 VITALS
DIASTOLIC BLOOD PRESSURE: 74 MMHG | HEART RATE: 71 BPM | TEMPERATURE: 98.6 F | SYSTOLIC BLOOD PRESSURE: 115 MMHG | OXYGEN SATURATION: 100 %

## 2018-05-21 LAB
BASOPHILS # BLD AUTO: 0.02 K/MM3 (ref 0–2)
BASOPHILS NFR BLD: 0.4 % (ref 0–3)
EOSINOPHIL # BLD: 0.1 10*3/UL (ref 0–0.7)
EOSINOPHIL NFR BLD: 2 % (ref 1.5–5)
ERYTHROCYTE [DISTWIDTH] IN BLOOD BY AUTOMATED COUNT: 19.9 % (ref 11.5–14.5)
GRANULOCYTES # BLD: 3.41 10*3/UL (ref 1.4–6.5)
GRANULOCYTES NFR BLD: 63.1 % (ref 50–68)
HEMOGLOBIN A: 96.6 PERCENT (ref 96–?)
HGB A2 MFR BLD COLUMN CHROM: 2.4 PERCENT (ref 1.8–3.5)
HGB BLD-MCNC: 8.7 G/DL (ref 12–16)
HGB C MFR BLD: 0 PERCENT (ref 0–0)
HGB S MFR BLD: 0 PERCENT (ref 0–0)
INR PPP: 2.74 (ref 0.93–1.08)
LYMPHOCYTES # BLD: 1.2 10*3/UL (ref 1.2–3.4)
LYMPHOCYTES NFR BLD AUTO: 21.7 % (ref 22–35)
MCH RBC QN AUTO: 26.5 PG (ref 25–35)
MCHC RBC AUTO-ENTMCNC: 31.5 G/DL (ref 31–37)
MCV RBC AUTO: 84.1 FL (ref 80–105)
MONOCYTES # BLD AUTO: 0.7 10*3/UL (ref 0.1–0.6)
MONOCYTES NFR BLD: 12.8 % (ref 1–6)
PLATELET # BLD: 320 10^3/UL (ref 120–450)
PMV BLD AUTO: 9.4 FL (ref 7–11)
PROTHROMBIN TIME: 32.2 SECONDS (ref 9.4–12.5)
RBC # BLD AUTO: 3.28 10^6/UL (ref 3.5–6.1)
WBC # BLD AUTO: 5.4 10^3/UL (ref 4.5–11)

## 2018-05-21 RX ADMIN — HEPARIN SODIUM PRN MLS/HR: 10000 INJECTION, SOLUTION INTRAVENOUS at 08:51

## 2018-05-21 RX ADMIN — PANTOPRAZOLE SODIUM SCH MG: 40 TABLET, DELAYED RELEASE ORAL at 05:33

## 2018-05-21 RX ADMIN — SULFAMETHOXAZOLE AND TRIMETHOPRIM SCH TAB: 400; 80 TABLET ORAL at 09:39

## 2018-05-21 RX ADMIN — HEPARIN SODIUM PRN MLS/HR: 10000 INJECTION, SOLUTION INTRAVENOUS at 08:57

## 2018-05-21 NOTE — CP.PCM.PN
Subjective





- Date & Time of Evaluation


Date of Evaluation: 05/21/18


Time of Evaluation: 11:25





- Subjective


Subjective: 





Patient denies dysphagia or odynophagia, no fevers, not in distress, feels 

better.





Objective





- Vital Signs/Intake and Output


Vital Signs (last 24 hours): 


 











Temp Pulse Resp BP Pulse Ox


 


 98.6 F   71   18   115/74   100 


 


 05/21/18 06:00  05/21/18 06:00  05/21/18 06:00  05/21/18 06:00  05/21/18 06:00








Intake and Output: 


 











 05/21/18 05/21/18





 06:59 18:59


 


Intake Total 660 250


 


Balance 660 250














- Medications


Medications: 


 Current Medications





Azithromycin (Zithromax)  1,250 mg PO MON TD


   PRN Reason: Protocol


Clarithromycin (Biaxin Filmtab)  1,250 mg PO STAT TD


   PRN Reason: Protocol


   Last Admin: 05/20/18 19:03 Dose:  1,250 mg


Doxycycline Hyclate (Doryx)  100 mg PO Q12 [facility]


   PRN Reason: Protocol


   Stop: 05/25/18 22:01


   Last Admin: 05/20/18 21:28 Dose:  100 mg


Heparin Sodium/Sodium Chloride (Heparin 30902 Units/250ml 1/2 Normal Saline)  25

,000 units in 250 mls @ 9.226 mls/hr IV .Q24H PRN; Protocol; 18 UNITS/KG/HR


   PRN Reason: ADJUST RATE PER PROTOCOL


   Last Admin: 05/21/18 08:57 Dose:  11.41 units/kg/hr, 5.848 mls/hr


Pantoprazole Sodium (Protonix Ec Tab)  40 mg PO 0600 [facility]


   Last Admin: 05/21/18 05:33 Dose:  40 mg


Sodium Chloride (Sodium Chloride 7% Inhalation)  4 ml IH QIDRESP [facility]


Trimethoprim/Sulfamethoxazole (Bactrim Ss Tab)  1 tab PO DAILY TD


   PRN Reason: Protocol


   Last Admin: 05/20/18 12:24 Dose:  1 tab


Warfarin Sodium (Coumadin)  5 mg PO 1800 [facility]


   PRN Reason: Protocol


   Last Admin: 05/20/18 19:03 Dose:  5 mg











- Labs


Labs: 


 





 05/21/18 06:30 





 05/20/18 07:00 





 











PT  32.2 SECONDS (9.4-12.5)  H  05/21/18  06:30    


 


INR  2.74  (0.93-1.08)  H  05/21/18  06:30    


 


APTT  86.2 Seconds (25.1-36.5)  H  05/21/18  06:30    














- Constitutional


Appears: Cachectic, Chronically Ill





- Head Exam


Head Exam: NORMAL INSPECTION





- ENT Exam


Additional comments: 





oral thrush





- Neck Exam


Neck Exam: absent: Meningismus





- Respiratory Exam


Respiratory Exam: Decreased Breath Sounds





- Cardiovascular Exam


Cardiovascular Exam: +S1, +S2





- GI/Abdominal Exam


GI & Abdominal Exam: Soft.  absent: Tenderness





Assessment and Plan





- Assessment and Plan (Free Text)


Plan: 





Assessment


sepsis due to multifocal pneumonia in this patient with HIV/AIDS current CD4 

count 21, with no evidence of TB


oral candidiasis


HBsAg positive, chronic hepatitis B infection


asthma


history of malaria





Plan


follow up HBV DNA PCR, serum crypt Ag; RPR is non-reactive


completed total 7 days of antibiotics - Doxycycline


will continue PJP prophylaxis and GILBERTO prophylaxis


patient may use Diflucan for the oral candidiasis


patient will need to be started on ART soon and should follow up with HIV 

provider as an outpatient, which should also cover Hepatitis B

## 2018-05-21 NOTE — CP.PCM.DIS
Addendum entered and electronically signed by Cristin Whitehead MD  05/21/18 16:00

: 





Patient seen and examined at bedside with residents.





52 year old female with past medical history of asthma and HIV with recent 

flight travel from [facility] who presented with shortness of breath.


VQ scan showed intermediate probability for PE and patient was started on 

heparin drip and coumadin. 


INR today is therapeutic and heparin drip was discontinued.  Patient needs 

lifelong anticoagulation as per heme/onc.


CD4 count was 21 and patient was started on PCP and MAC prophylaxis.  Patient 

was on antibiotics for pneumonia.





Overall patient's symptoms have improved.


She is discharged home to follow up at [facility] for INR checking.


Referred to HIV clinic and [facility] hepatitis clinic.





Cristin Whitehead MD


Hospitalist.








Addendum entered and electronically signed by Erna Jewell DO  05/21/18 15:44

: 





Attempted to call [phone] four times to make appointment on behalf of pt. 

but no call back and no voice answering machine. A prescription of INR on Wed 

and Sat was given to the patient to follow at [facility] clinic at 

[facility]. 





Original Note:








Provider





- Provider


Date of Admission: 


05/15/18 23:39





Attending physician: 


Colt Negrete MD





Primary care physician: 


NO PRIMARY CARE PROVIDER





Consults: 





Heme/onc: Palathingal


ID: Heena


Cardio: Dejan


Pulm: Liang


Time Spent in preparation of Discharge (in minutes): 45





Diagnosis





- Discharge Diagnosis


(1) Anemia


Status: Acute   





(2) CHF (congestive heart failure)


Status: Acute   





(3) HIV (human immunodeficiency virus infection)


Status: Acute   





(4) SOB (shortness of breath)


Status: Acute   





(5) Pulmonary embolism


Status: Acute   





Hospital Course





- Lab Results


Lab Results: 


 Micro Results





05/17/18 05:00   Other: Please Indicate   Mycobacterial Culture - Preliminary


05/17/18 18:38   Other: Please Indicate   Mycobacterial Culture - Preliminary





 Most Recent Lab Values











WBC  6.4 10^3/ul (4.5-11.0)   05/20/18  07:00    


 


RBC  3.32 10^6/uL (3.5-6.1)  L  05/20/18  07:00    


 


Hgb  8.8 g/dL (12.0-16.0)  L  05/20/18  07:00    


 


Hct  28.1 % (36.0-48.0)  L  05/20/18  07:00    


 


MCV  84.6 fl (80.0-105.0)   05/20/18  07:00    


 


MCH  26.5 pg (25.0-35.0)   05/20/18  07:00    


 


MCHC  31.3 g/dl (31.0-37.0)   05/20/18  07:00    


 


RDW  19.7 % (11.5-14.5)  H  05/20/18  07:00    


 


Plt Count  295 10^3/uL (120.0-450.0)   05/20/18  07:00    


 


MPV  9.4 fl (7.0-11.0)   05/20/18  07:00    


 


Gran %  69.9 % (50.0-68.0)  H  05/20/18  07:00    


 


Lymph % (Auto)  17.1 % (22.0-35.0)  L  05/20/18  07:00    


 


Mono % (Auto)  11.7 % (1.0-6.0)  H  05/20/18  07:00    


 


Eos % (Auto)  1.1 % (1.5-5.0)  L  05/20/18  07:00    


 


Baso % (Auto)  0.2 % (0.0-3.0)   05/20/18  07:00    


 


Gran #  4.49  (1.4-6.5)   05/20/18  07:00    


 


Lymph # (Auto)  1.1  (1.2-3.4)  L  05/20/18  07:00    


 


Mono # (Auto)  0.8  (0.1-0.6)  H  05/20/18  07:00    


 


Eos # (Auto)  0.1  (0.0-0.7)   05/20/18  07:00    


 


Baso # (Auto)  0.01 K/mm3 (0.0-2.0)   05/20/18  07:00    


 


Neutrophils % (Manual)  77 % (50.0-70.0)  H  05/15/18  18:55    


 


Lymphocytes % (Manual)  12 % (22.0-35.0)  L  05/15/18  18:55    


 


Monocytes % (Manual)  10 % (1.0-6.0)  H  05/15/18  18:55    


 


Eosinophils % (Manual)  1 % (0.0-3.0)   05/15/18  18:55    


 


Differential Comment  See pathology report   05/15/18  18:55    


 


Platelet Evaluation  Normal  (NORMAL)   05/15/18  18:55    


 


Hypochromasia  Slight   05/15/18  18:55    


 


Anisocytosis (manual)  1+   05/15/18  18:55    


 


Target Cells  Slight   05/15/18  18:55    


 


Retic Count  2.35 % (0.5-1.5)  H  05/16/18  06:00    


 


Hemoglobin A  96.6 Percent (>96.0)   05/17/18  20:15    


 


Hemoglobin A2  2.4 Percent (1.8-3.5)   05/17/18  20:15    


 


Hemoglobin C  0.0 Percent (0.0-0.0)   05/17/18  20:15    


 


Hemoglobin F (Fetal)  <1.0 Percent (<2.0)   05/17/18  20:15    


 


Hemoglobin S  0.0 Percent (0.0-0.0)   05/17/18  20:15    


 


Variant Hemoglobin  0.0 Percent (0.0-0.0)   05/17/18  20:15    


 


Hemoglobinopathy Red Blood Count  2.58 Mill/mcL (3.80-5.10)  L  05/17/18  20:15

    


 


Hemoglobinopathy Hct  21.8 % (35.0-45.0)  L  05/17/18  20:15    


 


Hemoglobinopathy Hgb  7.1 g/dL (11.7-15.5)  L  05/17/18  20:15    


 


Hemoglobinopathy MCV  84.3 fL (80.0-100.0)   05/17/18  20:15    


 


Hemoglobinopathy MCH  27.4 pg (27.0-33.0)   05/17/18  20:15    


 


Hemoglobinopathy RDW  22.0 % (11.0-15.0)  H  05/17/18  20:15    


 


Hemoglobinopathy Interp  See note   05/17/18  20:15    


 


Haptoglobin  251.5 mg/dL (30.0-200.0)  H  05/16/18  05:35    


 


PT  19.7 SECONDS (9.4-12.5)  H  05/20/18  07:00    


 


INR  1.69  (0.93-1.08)  H  05/20/18  07:00    


 


APTT  53.5 Seconds (25.1-36.5)  H  05/21/18  00:09    


 


D-Dimer, Quantitative  956 ng/mL (0-243)  H  05/15/18  18:55    


 


pO2  32 mm/Hg (30-55)   05/15/18  18:55    


 


VBG pH  7.42  (7.32-7.43)   05/15/18  18:55    


 


VBG pCO2  45.0  (40-60)   05/15/18  18:55    


 


VBG HCO3  29.2 mmol/l (21-28)  H  05/15/18  18:55    


 


VBG Total CO2  30.6 mmol.L (22-28)  H  05/15/18  18:55    


 


VBG O2 Sat (Calc)  56.5 % (40-65)   05/15/18  18:55    


 


VBG Base Excess  4.0 mmol/L (0.0-2.0)  H  05/15/18  18:55    


 


VBG Potassium  4.0 mmol/L (3.6-5.2)   05/15/18  18:55    


 


Sodium  137.0 mmol/L (132-148)   05/15/18  18:55    


 


Chloride  103.0 mmol/L ()   05/15/18  18:55    


 


Glucose  94 mg/dl ()   05/15/18  18:55    


 


Lactate  1.4 mmol/L (0.7-2.1)   05/15/18  18:55    


 


FiO2  21.0 %  05/15/18  18:55    


 


Sodium  141 mmol/L (132-148)   05/20/18  07:00    


 


Potassium  3.4 mmol/L (3.6-5.0)  L  05/20/18  07:00    


 


Chloride  110 mmol/L ()  H  05/20/18  07:00    


 


Carbon Dioxide  22 mmol/L (21-33)   05/20/18  07:00    


 


Anion Gap  13  (10-20)   05/20/18  07:00    


 


BUN  11 mg/dL (7-21)   05/20/18  07:00    


 


Creatinine  1.0 mg/dl (0.7-1.2)   05/20/18  07:00    


 


Est GFR ( Amer)  > 60   05/20/18  07:00    


 


Est GFR (Non-Af Amer)  58   05/20/18  07:00    


 


Random Glucose  84 mg/dL ()   05/20/18  07:00    


 


Hemoglobin A1c  6.2 % (4.2-6.5)   05/15/18  18:55    


 


Calcium  7.9 mg/dL (8.4-10.5)  L  05/20/18  07:00    


 


Magnesium  1.7 mg/dL (1.7-2.2)   05/15/18  18:55    


 


Iron  27 ug/dL ()  L  05/16/18  00:27    


 


TIBC  179 ug/dL (265-497)  L  05/16/18  00:27    


 


% Saturation  15 % (20-55)  L  05/16/18  00:27    


 


Transferrin  99.30 mg/dL (206-381)  L  05/16/18  01:20    


 


Ferritin  1570.0 ng/mL  05/16/18  00:27    


 


Total Bilirubin  0.6 mg/dL (0.2-1.3)   05/20/18  07:00    


 


AST  57 U/L (14-36)  H  05/20/18  07:00    


 


ALT  25 U/L (7-56)   05/20/18  07:00    


 


Alkaline Phosphatase  111 U/L ()   05/20/18  07:00    


 


Lactate Dehydrogenase  562 U/L (333-699)   05/15/18  18:55    


 


Total Creatine Kinase  < 20 U/L ()  L  05/15/18  18:55    


 


Troponin I  0.02 ng/mL  05/15/18  18:55    


 


NT-Pro-B Natriuret Pep  2430 pg/mL (0-450)  H  05/15/18  18:55    


 


Total Protein  7.6 g/dL (5.8-8.3)   05/20/18  07:00    


 


Albumin  2.5 g/dL (3.0-4.8)  L  05/20/18  07:00    


 


Globulin  5.1 gm/dL  05/20/18  07:00    


 


Albumin/Globulin Ratio  0.5  (1.1-1.8)  L  05/20/18  07:00    


 


Alpha-1-Antitrypsin  244 mg/dL ()  H  05/17/18  05:30    


 


Triglycerides  144 mg/dL ()   05/16/18  00:27    


 


Cholesterol  114 mg/dL (130-200)  L  05/16/18  00:27    


 


LDL Cholesterol Direct  80 mg/dL (0-129)   05/16/18  00:27    


 


HDL Cholesterol  12 mg/dL (29-60)  L  05/16/18  00:27    


 


Vitamin B12  975 pg/mL (239-931)  H  05/17/18  20:15    


 


Folate  9.9 ng/mL  05/16/18  00:27    


 


Procalcitonin  0.72 NG/ML (0.19-0.49)  H  05/18/18  22:16    


 


Thyroxine (T4)  5.6 ug/dL (5.5-11.0)   05/17/18  20:15    


 


Total T3  0.59 ng/mL (0.97-1.69)  L  05/17/18  20:15    


 


TSH 3rd Generation  1.89 mIU/mL (0.46-4.68)   05/17/18  20:15    


 


Venous Blood Potassium  4.0 mmol/L (3.6-5.2)   05/15/18  18:55    


 


Urine Color  Yellow  (YELLOW)   05/15/18  20:30    


 


Urine Appearance  Clear  (CLEAR)   05/15/18  20:30    


 


Urine pH  6.0  (4.7-8.0)   05/15/18  20:30    


 


Ur Specific Gravity  1.025  (1.005-1.035)   05/15/18  20:30    


 


Urine Protein  100 mg/dL (<30 mg/dL)  H  05/15/18  20:30    


 


Urine Glucose (UA)  Negative mg/dL (NEGATIVE)   05/15/18  20:30    


 


Urine Ketones  Negative mg/dL (NEGATIVE)   05/15/18  20:30    


 


Urine Blood  Trace-intact  (NEGATIVE)  H  05/15/18  20:30    


 


Urine Nitrate  Negative  (NEGATIVE)   05/15/18  20:30    


 


Urine Bilirubin  Negative  (NEGATIVE)   05/15/18  20:30    


 


Urine Urobilinogen  2.0 E.U./dL (<1 E.U./dL)  H  05/15/18  20:30    


 


Ur Leukocyte Esterase  Small Ronaldo/uL (NEGATIVE)  H  05/15/18  20:30    


 


Urine RBC  2 - 5 /hpf (0-2)   05/15/18  20:30    


 


Urine WBC  10 - 15 /hpf (0-6)   05/15/18  20:30    


 


Ur Epithelial Cells  6 - 8 /hpf (0-5)   05/15/18  20:30    


 


Amorphous Sediment  Moderate   05/15/18  20:30    


 


Urine Bacteria  Many  (NEG)   05/15/18  20:30    


 


Hyaline Casts  0 - 2 /hpf  05/15/18  20:30    


 


Fine Granular Casts  0 - 2 /hpf (0-2)   05/15/18  20:30    


 


Coarse Granular Casts  Small /hpf (0-2)  H  05/15/18  20:30    


 


Urine Other  Uyeast   05/15/18  20:30    


 


Absolute Lymphs (Flow)  632 Cells/mcL (850-3900)  L  05/16/18  01:20    


 


% CD4 Cells  3 Percent (30-61)  L  05/16/18  01:20    


 


Absolute CD4 Count  21 Cells/mcL (490-1740)  L  05/16/18  01:20    


 


T-Help/Suppress Ratio  0.05 Ratio (0.86-5.00)  L  05/16/18  01:20    


 


% CD8 Cells  63 Percent (12-42)  H  05/16/18  01:20    


 


Absolute CD8 Count  400 Cells/mcL (180-1170)   05/16/18  01:20    


 


T-Lymph Analys Comment  See note   05/16/18  01:20    


 


RPR  Nonreactive  (NONREACTIVE)   05/18/18  15:50    


 


Hepatitis A IgM Ab  Negative  (NEGATIVE)   05/16/18  00:27    


 


Hep Bs Antigen  Reactive  (NEGATIVE)   05/16/18  00:27    


 


Hep Bs Ag Neutralizatn  Confirmed positive  H  05/16/18  00:27    


 


Hep B Core IgM Ab  Negative  (NEGATIVE)   05/16/18  00:27    


 


Hepatitis C Antibody  Negative  (NEGATIVE)   05/16/18  00:27    


 


HIV-1 RNA Qnt (RT-PCR)  6.28   (Not Detected)  H  05/16/18  01:20    


 


HIV 1&2 Antibody Screen  Reactive  (NEGATIVE)   05/16/18  05:35    


 


Ur L.pneumophila Ag  Negative  (NEGATIVE)   05/16/18  15:22    


 


Malaria Source  See note  (NEGATIVE)   05/16/18  00:27    


 


Ur Strep pneumoniae Ag  Not detected   05/16/18  15:22    


 


TB Test (QFT) Nil  TNP   05/16/18  05:00    


 


TB Test Mitogen - Nil  TNP   05/16/18  05:00    


 


TB Test TB - Nil  TNP   05/16/18  05:00    


 


TB Test (QFT)  TNP   05/16/18  05:00    


 


Blood Type  B POSITIVE   05/18/18  15:50    


 


Blood Type Confirm  B POSITIVE   05/18/18  16:25    


 


Antibody Screen  Negative   05/18/18  15:50    


 


Crossmatch  See Detail   05/18/18  15:50    


 


BBK History Checked  No verified bt   05/18/18  15:50    














- Hospital Course


Hospital Course: 





52 year old  female with past medical history that includes Asthma, HIV 

with unknown viral load or CD4 count, and recent diagnosis and treatment for 

malaria who presents to [facility] ED complaining of shortness of breath. Patient 

reports a 3 plus pillow orthopnea. Denies cough or swelling of lower 

extremities.  Patient reports she recently traveled from [facility] by airplane 3 

weeks prior to presentation. Patient was previously seen in [facility] and 

diagnosed with Malaria and treated inpatient for 2 weeks. Patient reports her 

Malaria was diagnosed via blood work. Of note patient has not been on any HAART 

therapy for HIV in over a year. Patient does not follow a regular doctor to 

manage her HIV. Patient reports a 3 kg weight loss secondary to lack of 

appetite over the past month. Other than shortness of breath, patient denies 

fever, chills, body aches, night sweats, headache, changes in vision, numbness, 

dizziness, hearing loss, acute memory loss, trouble swallowing, oral ulcers, 

chest pain, abdominal pain, diarrhea, constipation, new skin rashes or lesions.





Patient was admitted for PE, pneumonia and end stage AIDS. She was started on 

Heparin drip. V/q Scan revealed PE. LE doppler was negative. ECHO showed right 

atrium dilation, mod TR, pericardium thickening and normal EF (see full report)

. ID, Pulm, Heme/ONC and Cardio were all consulted. CT chest demonstrated multi-

focal pneumonia and right sided hydronephrosis. Patient was received IV 

antibiotics and Antivirals to treat her conditions. Over the 6 day, hospital 

course patient clinically improved. She was bridged from heparin to warfarin to 

achieve therapeutic INR. She was deemed medically stable for discharge as per 

Dr. Whitehead on 5/21. She sent home with prescription for Azithromycin, 

Doxycycline and Warfarin. Patient instructed to follow up and establish care 

with [facility] clinic at [facility] on wednesday 5/23 in order to get INR 

checked. She is to return on Saturday 5/26 as well. It is critical for patient 

to folow up. All instructions were explained in detail and she verbalized 

agreement.





This is a summary of the hospital course. Please refer to EMR for more details.)





Discharge Exam





- Head Exam


Head Exam: ATRAUMATIC, NORMOCEPHALIC





- Eye Exam


Eye Exam: EOMI, Normal appearance


Pupil Exam: PERRL





- ENT Exam


ENT Exam: Mucous Membranes Moist





- Neck Exam


Neck exam: Normal Inspection





- Respiratory Exam


Respiratory Exam: NORMAL BREATHING PATTERN





- Cardiovascular Exam


Cardiovascular Exam: REGULAR RHYTHM





- GI/Abdominal Exam


GI & Abdominal Exam: Normal Bowel Sounds, Soft.  absent: Tenderness





- Extremities Exam


Extremities exam: normal capillary refill, pedal pulses present





- Back Exam


Back exam: absent: CVA tenderness (L), CVA tenderness (R)





- Neurological Exam


Neurological exam: Alert, CN II-XII Intact, Oriented x3





- Psychiatric Exam


Psychiatric exam: Normal Affect, Normal Mood





- Skin


Skin Exam: Dry, Intact, Normal Color, Warm





Discharge Plan





- Discharge Medications


Prescriptions: 


Azithromycin [Zithromax] 1,250 mg PO MON #6 tab


Doxycycline Hyclate [Doryx] 100 mg PO Q12 #8 cap


Sulfamethoxazole/Trimethoprim [Bactrim  mg-160 mg] 1 tab PO DAILY #30 tab


Warfarin [Coumadin] 3 mg PO 1800 #30 tab





- Follow Up Plan


Condition: GOOD


Disposition: HOME/ ROUTINE


Instructions:  Heart Failure, Adult, Shortness of Breath (Dyspnea) (DC), HIV/

AIDS (DC), When Your Lungs Fill With Fluid, Normocytic Normochromic Anemia (DC)

, Pulmonary Edema (DC)


Additional Instructions: 


1. Follow up with HIV clinic to start HIV treatment and manage chronic 

hepatitis B infection:


   [facility] 


   [address] [place] [id number]


   [phone]


      or


   HIV Clinic at [address] [address]


   Ph. [phone] 


2. Make an appointment at [facility]. 

This is your primary care clinic. 


    Call and make appointment on Wednesday to check INR


3. A copy of application to AIDS Drug Assistance Program ( ADAP ) is given to 

patient.


Referrals: 


[facility] at [facility] [Outside]


PCP,NO [Primary Care Provider] -

## 2018-05-22 LAB — TB ANTIGEN MINUS NIL: <0 IU/ML

## 2018-05-22 NOTE — PQF SEPSIS
***** This form is a permanent part of the medical record*****

Dr. Negrete,

Sepsis were documented by the Infectious Disease doctors. Please clarify if 
sepsis is present on admission. Thank you.



   

Clarification of your documentation is requested to better reflect the severity 
of illness and intensity of treatment of your patient.  



Indicators present   



[x] Temp < 96.8 or > 100.4            



[] WBC count > 12,000/mm3 or         

      <000/mm3 or 10% immature neutrophils               



[x] Heart Rate  > 90               



[x] Respiratory Rate > 20            



[] Fever or hypothermia            

 

[] Chills                  



[] Positive blood cultures            



[] Hypotension               



[] Metabolic acidosis                

     (Elevated lactate level, anion gap or reduced blood pH)            



[] Acute confusion /Altered Mental Status      



[] Shock                  

 

[] Other: []         



Location in the medical record that reflects the above clinical findings: [] ID 
physician's documentations





Treatment Provided: []



PHYSICIAN'S RESPONSE





Based on your medical judgment of the clinical indicators outlined above, are 
you treating this patient for a known or suspected: 

 

[] Sepsis / Septicemia   Please specify organism if known []



[x] SIRS (Systemic Inflammatory Response Syndrome)

 

[] Severe Sepsis (Sepsis with Associated Organ Dysfunction)



[] Fever of Unknown Origin



[] Other, please indicate: []     



[] If Unable to Determine, please check the box, sign and date.   



Present On Admission (POA) Indicator:



[x] Present at the time of admission



[] Not present at the time of admission



[] Clinically Undetermined



In responding to this query, please exercise your independent professional 
judgment.  The fact that a question is asked does not imply that any particular 
answer is desired or expected.  Thank you for your clarification on this 
documentation.



If you have any questions please call:[ ]

* Thank you,

   [ ]kim MARIE Coder
BING

## 2018-08-20 NOTE — PN
DATE:  05/19/2018



SUBJECTIVE:  The patient is in bed in no acute distress, nontoxic.  No

fevers and no chills.



PHYSICAL EXAMINATION:

VITAL SIGNS:  Temperature is 98, blood pressure is 83/40, respiratory rate

of 18.

HEENT:  Unremarkable.

NECK:  Supple.

LUNGS:  Have decreased breath sounds.

HEART:  Normal S1, S2.

ABDOMEN:  Soft, nontender.



LABORATORY DATA:  Reveals a white count of 6000, hemoglobin of 8 and

platelets of 254.  Chemistries reveals a BUN of 12, creatinine of 1.  The

patient's procalcitonin 1.38 on 05/16/2018.  Urinalysis is noted. 

Immunology is reviewed.  T cells are down to 21 with 3% PCR.  HIV PCR is

elevated.  Malaria smears are negative.  Microbiology reveals the

mycobacterium, AFB smears are negative.  Blood cultures are negative. 

Review of orders reveals the repeat procalcitonin is pending.



MEDICATIONS:  The patient is on cefepime and the patient is also on Bactrim

for prophylaxis and azithromycin for GILBERTO prophylaxis.



ASSESSMENT AND PLAN:  This is a 52-year-old female originally from [facility],

[facility] with sepsis with multifocal pneumonia with an elevated

procalcitonin, probable bacterial in origin and the patient with end-stage

AIDS with T-cells of 21.  I doubt tuberculosis with negative AFB smears

based on the patient was recently treated for malaria, also has hepatitis B

surface antigen positive, chronic hepatitis B infection.  Check on the

repeat procalcitonin; if it is improved may be able to switch to p.o.

antibiotics and the patient will need to start human immunodeficiency virus

therapy, which can be done with her primary human immunodeficiency virus

doctor as outpatient.





__________________________________________

Ricky Naik MD



DD:  05/19/2018 9:04:12

DT:  05/19/2018 9:06:04

Job # [id number] No

## 2021-04-23 NOTE — CARD
--------------- APPROVED REPORT --------------





EXAM: Two-dimensional and M-mode echocardiogram with Doppler and 

color Doppler.



INDICATION

Dyspnea Congestive Heart Failure 



2D DIMENSIONS 

IVSd1.2   (0.7-1.1cm)LVDd3.6   (3.9-5.9cm)

PWd1.2   (0.7-1.1cm)LVDs2.7   (2.5-4.0cm)

FS (%) 25.0   %LVEF (%)50.3   (>50%)



M-Mode DIMENSIONS 

Aortic Root3.00   (2.2-3.7cm)Aortic Cusp Exc.1.80   (1.5-2.0cm)



Aortic Valve

AoV Peak Aqnafhch179.0cm/Ludmila Peak GR.5mmHg



Mitral Valve

MV E Ietobzju65.9cm/sMV A Vyjwuygx96.4cm/sE/A ratio1.0



TDI

Lateral E' Peak V12.90cm/sMedial E' Peak V8.38cm/sE/Lateral E'6.8

E/Medial E'10.5



Pulmonary Valve

PV Peak Asobyxfj22.5cm/sPV Peak Grad.2mmHg



Tricuspid Valve

TR Peak Khurttma794nd/sRAP [id number] Peak Gr.85mmHg

[id number]



 LEFT VENTRICLE 

The left ventricle is normal size. There is normal left ventricular 

wall thickness. Left ventricle systolic function is borderline. There 

is normal LV segmental wall motion. Transmitral Doppler flow pattern 

is Grade I-abnormal relaxation pattern.



 RIGHT VENTRICLE 

The right ventricle is moderately dilated. There is normal right 

ventricular wall thickness. The right ventricular systolic function 

is normal.



 ATRIA 

The left atrium size is normal. The right atrium is moderately 

dilated.



 AORTIC VALVE 

The aortic valve is mildly thickened. No aortic regurgitation is 

present. There is no aortic valvular stenosis.



 MITRAL VALVE 

The mitral valve is mildly thickened. Mitral regurgitation is 

moderate.



 TRICUSPID VALVE 

There is moderate tricuspid regurgitation. There is severe pulmonary 

hypertension.



 PULMONIC VALVE 

There is mild pulmonic valvular regurgitation.



 GREAT VESSELS 

The aortic root is normal in size. a thrombus is seen in the IVC.



 PERICARDIAL EFFUSION 

Pericardium appears moderately thickened.



<Conclusion>

The left ventricle is normal size.

There is normal left ventricular wall thickness.

Left ventricle systolic function is borderline.

There is normal LV segmental wall motion.

Transmitral Doppler flow pattern is Grade I-abnormal relaxation 

pattern.

The right ventricle is moderately dilated.The right atrium is 

moderately dilated.

Mitral regurgitation is moderate.

There is moderate tricuspid regurgitation.There is severe pulmonary 

hypertension.

Pericardium appears moderately thickened.

a thrombus is seen in the IVC. Nephrology

## 2024-05-28 NOTE — ED PDOC
Arrival/HPI





- General


Chief Complaint: Shortness Of Breath


Time Seen by Provider: 05/15/18 17:12


Historian: Patient, Family





- History of Present Illness


Narrative History of Present Illness (Text): 


52yoF, with HIV diagnosed , off medications for 1 year after and unknown 

CD4 count or viral load, Asthma, recently diagnosed with malaria about 3 weeks 

ago and now came from [facility] and stating difficulty breathing, otherwise 

without any nausea/vomiting/headache/dizziness/chest pain/abdomen pain/numbness/

tingling/loss of limb function/pain with urination/history of cancer or blood 

clots or hormonal use. 


05/15/18 19:24





05/15/18 22:09





Time/Duration: 1 week


Symptom Onset: Gradual


Symptom Course: Unchanged, Intermittent


Quality: Other (no pain)


Activities at Onset: Rest


Context: Sitting





Past Medical History





- Provider Review


Nursing Documentation Reviewed: Yes





- Travel History


Have you recently traveled outside [place] w/in the past 3 mons?: No


If Yes, travel location?: [facility]





- Cardiac


Hx Cardiac Disorders: No





- Pulmonary


Hx Respiratory Disorders: Yes


Hx Asthma: Yes





- Neurological


Hx Neurological Disorder: No





- HEENT


Hx HEENT Disorder: No





- Renal


Hx Renal Disorder: No





- Endocrine/Metabolic


Hx Endocrine Disorders: No





- Hematological/Oncological


Hx Blood Disorders: Yes


Other/Comment: MALARIA





- Integumentary


Hx Dermatological Disorder: No





- Musculoskeletal/Rheumatological


Hx Musculoskeletal Disorders: No





- Gastrointestinal


Hx Gastrointestinal Disorders: No





- Genitourinary/Gynecological


Hx Genitourinary Disorders: No





- Psychiatric


Hx Psychophysiologic Disorder: No


Hx Substance Use: No





- Surgical History


Hx  Section: Yes





Family/Social History





- Physician Review


Nursing Documentation Reviewed: Yes


Family/Social History: No Known Family HX


Smoking Status: Never Smoked


Hx Alcohol Use: No


Hx Substance Use: No





Allergies/Home Meds


Allergies/Adverse Reactions: 


Allergies





No Known Allergies Allergy (Verified 05/15/18 17:01)


 








Home Medications: 


 Home Meds











 Medication  Instructions  Recorded  Confirmed


 


No Known Home Med  05/15/18 05/15/18














Review of Systems





- Review of Systems


Constitutional: Normal


Eyes: Normal


ENT: Normal


Respiratory: SOB


Cardiovascular: Normal


Gastrointestinal: Normal


Genitourinary Female: Normal


Musculoskeletal: Normal


Skin: Normal


Neurological: Normal


Endocrine: Normal


Hemo/Lymphatic: Normal


Psychiatric: Normal





Physical Exam


Vital Signs Reviewed: Yes


Vital Signs











  Temp Pulse Resp BP Pulse Ox


 


 05/15/18 20:50   76  17  96/60 L  100


 


 05/15/18 18:30    18  


 


 05/15/18 17:06  98.8 F  110 H  19  94/61 L  99











Temperature: Afebrile


Blood Pressure: Hypertensive


Pulse: Tachycardic


Respiratory Rate: Normal


Appearance: Positive for: Well-Appearing, Non-Toxic, Comfortable


Pain Distress: None


Mental Status: Positive for: Alert and Oriented X 3





- Systems Exam


Head: Present: Atraumatic, Normocephalic


Pupils: Present: PERRL


Extroacular Muscles: Present: EOMI


Conjunctiva: Present: Normal


Ears: Present: Normal


Mouth: Present: Moist Mucous Membranes


Pharnyx: Present: Normal


Nose (External): Present: Atraumatic


Nose (Internal): Present: Normal Inspection


Neck: Present: Normal Range of Motion


Respiratory/Chest: Present: Clear to Auscultation, Good Air Exchange


Cardiovascular: Present: Regular Rate and Rhythm


Abdomen: No: Tenderness, Distention, Normal Bowel Sounds, Peritoneal Signs, 

Rebound, Guarding, McBurney's Point Tender, Rovsing's Sign Present, Hernias, 

Feeding Tubes, Ostomy Tubes, Mass/Organomegaly, Scars, Other


Back: Present: Normal Inspection


Upper Extremity: Present: Normal Inspection


Lower Extremity: Present: Normal Inspection


Neurological: Present: GCS=15, CN II-XII Intact, Speech Normal, Motor Func 

Grossly Intact


Skin: Present: Warm, Normal Color


Psychiatric: Present: Alert, Oriented x 3, Normal Insight, Normal Concentration





Medical Decision Making


ED Course and Treatment: 


52yoF, with HIV diagnosed , off medications for 1 year after and unknown 

CD4 count and viral load, Asthma, recently diagnosed with malaria about 3 weeks 

ago and now came from [facility] and stating difficulty breathing, otherwise 

without any nausea/vomiting/headache/dizziness/chest pain/abdomen pain/numbness/

tingling/loss of limb function/pain with urination/history of cancer or blood 

clots or hormonal use. You were otherwise breathing easily, pink moist lips, 

smiling and talking easily, good strength/sensation, alert/oriented, walking 

easily, clear lungs, no abdomen tenderness, no fever temp 98.8, fast heart rate 

110, stable breathing rate 19, excellent oxygen level 99% room air, stable 

blood pressure 94/61which we recommend repeat in 2-3 days primary care office 

to determine further treatment, you have blood tests no infection count 8.5, 

low blood level hemoglobin 8.0 and platelets stable 266, bun elevated 26, 

creatinine elevated 1.3, liver AST elevated 74, Liver Alklaline Phosphatase 

elevated 138, heart blood test 0.02, d-dimer elevated 956, bnp 2430 elevated, 

lactic acid normal 1.4, pregnancy test negative, urine test trace leukocyte, 

radiology chest xray mild vascular markings, VQ Scan pending, ECG normal sinus 

rhythm, saline, observation, airborne isolation,  done in the ED with 

improvement.








d/w Dr. Almanza who will evaluate patient.


05/15/18 23:37





d/w Dr. Almanza who accepted the patient to telemetry for CHF and will hold lasix 

at this time as maintaining saturation on room air with bp 90s systolic, will 

follow-up NM VQ scan





05/15/18 23:41





Reassessment Condition: Re-examined, Improved





- Lab Interpretations


Lab Results: 








 05/15/18 18:55 





 05/15/18 18:55 





 Lab Results





05/15/18 20:30: Urine Color Yellow, Urine Appearance Clear, Urine pH 6.0, Ur 

Specific Gravity 1.025, Urine Protein 100 H, Urine Glucose (UA) Negative, Urine 

Ketones Negative, Urine Blood Trace-intact H, Urine Nitrate Negative, Urine 

Bilirubin Negative, Urine Urobilinogen 2.0 H, Ur Leukocyte Esterase Small H, 

Urine RBC 2 - 5, Urine WBC 10 - 15, Ur Epithelial Cells 6 - 8, Amorphous 

Sediment Moderate, Urine Bacteria Many, Hyaline Casts 0 - 2, Fine Granular 

Casts 0 - 2, Coarse Granular Casts Small H, Urine Other Uyeast


05/15/18 18:55: pO2 32, VBG pH 7.42, VBG pCO2 45.0, VBG HCO3 29.2 H, VBG Total 

CO2 30.6 H, VBG O2 Sat (Calc) 56.5, VBG Base Excess 4.0 H, VBG Potassium 4.0, 

Sodium 137.0, Chloride 103.0, Glucose 94, Lactate 1.4, FiO2 21.0, Venous Blood 

Potassium 4.0


05/15/18 18:55: Sodium 138, Chloride 103, Potassium 4.0, Carbon Dioxide 28, 

Anion Gap 11, BUN 26 H, Creatinine 1.3 H, Est GFR ( Amer) 52, Est GFR (

Non-Af Amer) 43, Random Glucose 97, Calcium 8.4, Magnesium 1.7, Total Bilirubin 

1.3, AST 74 H, ALT 29, Alkaline Phosphatase 138 H, Lactate Dehydrogenase 562, 

Total Creatine Kinase < 20 L, Troponin I 0.02, NT-Pro-B Natriuret Pep 2430 H, 

Total Protein 8.7 H, Albumin 3.1, Globulin 5.6, Albumin/Globulin Ratio 0.6 L


05/15/18 18:55: PT 13.9 H, INR 1.21 H, APTT 26.6, D-Dimer, Quantitative 956 H


05/15/18 18:55: WBC 8.5, RBC 3.10 L, Hgb 8.0 L, Hct 25.5 L, MCV 82.3, MCH 25.8, 

MCHC 31.4, RDW 20.2 H, Plt Count 266, MPV 9.6, Gran % 75.8 H, Lymph % (Auto) 

13.1 L, Mono % (Auto) 11.0 H, Eos % (Auto) 0.0 L, Baso % (Auto) 0.1, Gran # 6.43

, Lymph # (Auto) 1.1 L, Mono # (Auto) 0.9 H, Eos # (Auto) 0.0, Baso # (Auto) 

0.01








I have reviewed the lab results: Yes





- RAD Interpretation


Radiology Orders: 








05/15/18 20:47


LUNG PERF & VENT SCAN [NM] Stat 





05/15/18 20:48


CHEST PORTABLE [RAD] Stat 











: ED Physician (cxr mild vascular markings)





- EKG Interpretation


Interpreted by ED Physician: Yes (normal sinus rhythm)


Type: 12 lead EKG





- Medication Orders


Current Medication Orders: 











Discontinued Medications





Sodium Chloride (Sodium Chloride 0.9%)  1,000 mls @ 999 mls/hr IV .Q1H1M STA


   Stop: 05/15/18 20:26


   Last Admin: 05/15/18 20:50  Dose: 999 mls/hr





eMAR Start Stop


 Document     05/15/18 20:50  IT  (Rec: 05/15/18 20:50  IT  [id number])


     Intravenous Solution


      Start Date                                 05/15/18


      Start Time                                 20:50


      End Date                                   05/15/18





Piperacillin Sod/Tazobactam Sod (Zosyn 4.5 Gm In Ns 100ml)  4.5 gm in 100 mls @ 

200 mls/hr IVPB STAT STA


   PRN Reason: Protocol


   Stop: 05/15/18 22:38











Disposition/Present on Arrival





- Present on Arrival


Any Indicators Present on Arrival: Yes


History of DVT/PE: No


History of Uncontrolled Diabetes: No


Urinary Catheter: No


History of Decub. Ulcer: No


History Surgical Site Infection Following: None





- Disposition


Have Diagnosis and Disposition been Completed?: Yes


Diagnosis: 


 CHF (congestive heart failure), Anemia, HIV (human immunodeficiency virus 

infection), SOB (shortness of breath)





Disposition: HOSPITALIZED


Disposition Time: 23:42


Patient Plan: Admission, Telemetry


Condition: IMPROVED


Discharge Instructions (ExitCare):  Heart Failure (ED), Human Immunodeficiency 

Virus and Acquired Immune Deficiency Syndrome (ED)


Referrals: 


PCP,NO [Primary Care Provider] - Follow up with primary


Forms:  [employer] (English) 2.29